# Patient Record
Sex: MALE | Race: WHITE | NOT HISPANIC OR LATINO | Employment: OTHER | URBAN - METROPOLITAN AREA
[De-identification: names, ages, dates, MRNs, and addresses within clinical notes are randomized per-mention and may not be internally consistent; named-entity substitution may affect disease eponyms.]

---

## 2023-07-17 LAB
ALBUMIN (URINE) MG/SPEC: 11.5 MG/L (ref 0–30)
ALBUMIN/CREATININE RATIO: 19.3 MG/GCREAT
ALT SERPL-CCNC: 27 U/L (ref 7–52)
AST SERPL-CCNC: 21 U/L
CHOLESTEROL (EXTERNAL): 177 MG/DL (ref 0–199)
CREATININE (EXTERNAL): 1.03 MG/DL (ref 0.7–1.3)
CREATININE (URINE): 59.7 MG/DL
GFR ESTIMATED (EXTERNAL): 80 ML/MIN/1.73M2
GLUCOSE (EXTERNAL): 105 MG/DL (ref 70–105)
HBA1C MFR BLD: 6.1 % (ref 4–6)
HDLC SERPL-MCNC: 58 MG/DL
LDL CHOLESTEROL CALCULATED (EXTERNAL): 89 MG/DL (ref 20–99)
POTASSIUM (EXTERNAL): 4.7 MMOL/L (ref 3.5–5.1)
TRIGLYCERIDES (EXTERNAL): 152 MG/DL

## 2023-07-19 ENCOUNTER — TRANSFERRED RECORDS (OUTPATIENT)
Dept: MULTI SPECIALTY CLINIC | Facility: CLINIC | Age: 68
End: 2023-07-19

## 2024-02-19 ENCOUNTER — OFFICE VISIT (OUTPATIENT)
Dept: FAMILY MEDICINE | Facility: CLINIC | Age: 69
End: 2024-02-19
Payer: MEDICARE

## 2024-02-19 ENCOUNTER — E-CONSULT (OUTPATIENT)
Dept: FAMILY MEDICINE | Facility: CLINIC | Age: 69
End: 2024-02-19

## 2024-02-19 VITALS
RESPIRATION RATE: 16 BRPM | BODY MASS INDEX: 27.78 KG/M2 | DIASTOLIC BLOOD PRESSURE: 72 MMHG | HEART RATE: 71 BPM | HEIGHT: 68 IN | SYSTOLIC BLOOD PRESSURE: 130 MMHG | WEIGHT: 183.3 LBS | OXYGEN SATURATION: 98 % | TEMPERATURE: 98 F

## 2024-02-19 DIAGNOSIS — F41.1 GENERALIZED ANXIETY DISORDER: ICD-10-CM

## 2024-02-19 DIAGNOSIS — G89.29 CHRONIC RIGHT-SIDED LOW BACK PAIN WITHOUT SCIATICA: ICD-10-CM

## 2024-02-19 DIAGNOSIS — R06.83 SNORING: ICD-10-CM

## 2024-02-19 DIAGNOSIS — Z90.79 HISTORY OF PROSTATECTOMY: ICD-10-CM

## 2024-02-19 DIAGNOSIS — Z12.11 SCREENING FOR COLON CANCER: ICD-10-CM

## 2024-02-19 DIAGNOSIS — Z85.46 HISTORY OF PROSTATE CANCER: ICD-10-CM

## 2024-02-19 DIAGNOSIS — R06.81 WITNESSED EPISODE OF APNEA: ICD-10-CM

## 2024-02-19 DIAGNOSIS — Z90.49 HISTORY OF COLECTOMY: ICD-10-CM

## 2024-02-19 DIAGNOSIS — I10 ESSENTIAL HYPERTENSION: ICD-10-CM

## 2024-02-19 DIAGNOSIS — G56.22 ULNAR NEUROPATHY OF LEFT UPPER EXTREMITY: Primary | ICD-10-CM

## 2024-02-19 DIAGNOSIS — R73.03 PREDIABETES: ICD-10-CM

## 2024-02-19 DIAGNOSIS — H90.3 SENSORINEURAL HEARING LOSS, BILATERAL: ICD-10-CM

## 2024-02-19 DIAGNOSIS — R06.83 SNORING: Primary | ICD-10-CM

## 2024-02-19 DIAGNOSIS — Z90.49 HISTORY OF APPENDECTOMY: ICD-10-CM

## 2024-02-19 DIAGNOSIS — E78.2 MIXED HYPERLIPIDEMIA: ICD-10-CM

## 2024-02-19 DIAGNOSIS — Z12.11 SCREEN FOR COLON CANCER: ICD-10-CM

## 2024-02-19 DIAGNOSIS — M54.50 CHRONIC RIGHT-SIDED LOW BACK PAIN WITHOUT SCIATICA: ICD-10-CM

## 2024-02-19 PROBLEM — G56.20 ULNAR NERVE COMPRESSION: Status: ACTIVE | Noted: 2023-03-01

## 2024-02-19 PROCEDURE — 99204 OFFICE O/P NEW MOD 45 MIN: CPT | Performed by: STUDENT IN AN ORGANIZED HEALTH CARE EDUCATION/TRAINING PROGRAM

## 2024-02-19 PROCEDURE — 99207 E-CONSULT TO SLEEP MEDICINE (ADULT OUTPT PROVIDER TO SPECIALIST WRITTEN QUESTION & RESPONSE): CPT | Performed by: STUDENT IN AN ORGANIZED HEALTH CARE EDUCATION/TRAINING PROGRAM

## 2024-02-19 PROCEDURE — 99451 NTRPROF PH1/NTRNET/EHR 5/>: CPT | Performed by: INTERNAL MEDICINE

## 2024-02-19 RX ORDER — ALPRAZOLAM 0.5 MG
TABLET ORAL
COMMUNITY
Start: 2023-03-31 | End: 2024-02-20

## 2024-02-19 RX ORDER — EZETIMIBE 10 MG/1
TABLET ORAL
COMMUNITY
Start: 2020-01-01 | End: 2024-05-06

## 2024-02-19 RX ORDER — FLUOXETINE 40 MG/1
CAPSULE ORAL 3 TIMES DAILY
COMMUNITY
Start: 2023-01-16 | End: 2024-05-06

## 2024-02-19 RX ORDER — ASPIRIN 81 MG/1
81 TABLET ORAL DAILY
COMMUNITY
End: 2024-05-06

## 2024-02-19 RX ORDER — LISINOPRIL 20 MG/1
TABLET ORAL
COMMUNITY
Start: 2020-01-01 | End: 2024-07-29

## 2024-02-19 RX ORDER — TRAZODONE HYDROCHLORIDE 100 MG/1
TABLET ORAL
COMMUNITY
Start: 2023-01-16 | End: 2024-05-06

## 2024-02-19 RX ORDER — METFORMIN HCL 500 MG
TABLET, EXTENDED RELEASE 24 HR ORAL
COMMUNITY
Start: 2024-01-16 | End: 2024-07-29

## 2024-02-19 NOTE — PROGRESS NOTES
Assessment & Plan     Corey is a 68-year-old male with prediabetes, hypertension, generalized anxiety disorder, bilateral sensorineural hearing loss, chronic right back pain, history of prostate cancer status post prostatectomy, appendectomy and colectomy who presents today to establish care and be evaluated for ulnar neuropathy    Ulnar neuropathy left  Has had numbness to 4th and 5th digit with parestheasis  since approximately July 2023, and onset of weakness of  recently.  He was seen by an orthopedist in [place] who did EMG in AUG 2023 that was diagnostic of ulnar neuropathy.  History and examination today are also consistent with this diagnosis.  He has not attempted any treatment thus far.  He was offered surgery and would like to pursue this further.  Discussed with patient that often occupational therapy is recommended prior to surgery, but he declines.  Referral was placed to orthopedic surgery to further evaluate and provide surgical treatment if necessary.   - Orthopedic  Referral    Snoring  Reports loud snoring and daytime tiredness.  Neck is 42 cm in circumference.  Has high risk of ENMANUEL given age, hypertension, loud snoring, gender, and report that his spouse has seen gasp for air/not breathe during sleep. Recommended for e-consult for further evaluation to which patient agreed. This was placed.   - Adult E-Consult to Sleep Medicine (Outpt Provider to Specialist Written Question & Response)    Screen for colon cancer  Has history of partial colectomy that was done due to appendicial tumor and prostate cancer s/p prostatectomy, and was recommended to have repeat colonoscopy every 5 years, last colonoscopy done 2019.  Referral placed to GI for colonoscopy for colon cancer screening.     Hyperlipidemia  On zetia, previously unable to tolerate statin    Prediabetes  Treated with metformin 500 mg daily.    Essential hypertension  Treated with lisionpril 20 mg daily, well controlled.  "    Generalized anxiety disorder  Treated with prozac 40 mg daily, well controlled. Also has xanax to use for panic episodes, that are infrequent, has not used for more than 9 months.  Trazodone helps patient to treat anxiety related insomnia, is well controlled on current regimen.     History of colectomy  See above.   - Colonoscopy Screening  Referral    History of prostate cancer  Reports history of prostatectomy    History of prostatectomy      History of appendectomy  Due to benign tumor of appendix    Chronic right-sided low back pain without sciatica  Well controlled with chiropractic care    Sensorineural hearing loss, bilateral   service connected, uses hearing aides    Screening for colon cancer  As above  - Colonoscopy Screening  Referral              BMI  Estimated body mass index is 28.1 kg/m  as calculated from the following:    Height as of this encounter: 1.72 m (5' 7.72\").    Weight as of this encounter: 83.1 kg (183 lb 4.8 oz).         Follow up in July for adult well exam, medicare annual visit.     Jovan Nicole is a 68 year old, presenting for the following health issues:  Establish Care (Ortho referral. Sleep apnea concerns. PSA labs in July.)        2/19/2024     2:26 PM   Additional Questions   Roomed by Kely VALERA     History of Present Illness       Reason for visit:  Ulnar Nerve and PCM  Symptom onset:  More than a month  Symptoms include:  Numbness in left hand  Symptom intensity:  Severe  Symptom progression:  Staying the same  Had these symptoms before:  No    He eats 4 or more servings of fruits and vegetables daily.He consumes 0 sweetened beverage(s) daily.He exercises with enough effort to increase his heart rate 60 or more minutes per day.  He exercises with enough effort to increase his heart rate 4 days per week.   He is taking medications regularly.     Has been noticing numbness and tingling of the left hand at the 4th and 5th digits since since " "about July 2023. More recently has been noticing weakness to the same digits.  Right hand is unaffected.  There was no preceding trauma/injury.  Has not attempted any other treatment thus far. Had been seen by an orthopedist who had recommended decompression surgery, but he had declined due to an upcoming move to [place] (was originally seen for this in [facility].       Reports loud snoring, with periods of not breathing during sleep/gasping for air during sleep observed by his spouse.  He has day time tiredness and usually takes a nap in the afternoon to combat this. Denies dozing off during the day.  Has never had a sleep study.     Review of Systems  Constitutional, neuro, ENT, endocrine, pulmonary, cardiac, gastrointestinal, genitourinary, musculoskeletal, integument and psychiatric systems are negative, except as otherwise noted.      Objective    /72 (BP Location: Right arm, Patient Position: Sitting, Cuff Size: Adult Regular)   Pulse 71   Temp 98  F (36.7  C) (Oral)   Resp 16   Ht 1.72 m (5' 7.72\")   Wt 83.1 kg (183 lb 4.8 oz)   SpO2 98%   BMI 28.10 kg/m    Body mass index is 28.1 kg/m .  Physical Exam   GENERAL: alert and no distress  EYES: Eyes grossly normal to inspection, and conjunctivae and sclerae normal  HENT: Nose and mouth without ulcers or lesions  NECK: no adenopathy, no asymmetry, masses, or scars, 42 cm in circumference  MS: no gross musculoskeletal defects noted, no edema.  4+/5 strength of  in left hand, otherwise normal strength of finger flexion and extension in all digits of the hands.  Mild hypothenar wasting appreciated.   SKIN: no suspicious lesions or rashes  NEURO: Decreased sensation to the 4th and 5th digits  of the left hand to light touch, tinels sign at elbow is positive, tinels signs at wrist is negative, phalens negative,  mentation intact and speech normal  PSYCH: mentation appears normal, affect normal/bright    No results found for any " previous visit.           Signed Electronically by: Lewis Atkinson MD

## 2024-02-19 NOTE — PROGRESS NOTES
"    2/19/2024     E-Consult has been accepted.    Interprofessional consultation requested by:  Lewis Atkinson MD      Clinical Question/Purpose: MY CLINICAL QUESTION IS: snoring and day time tiredness in this patient, would you recommend a home vs in lab sleep study vs in person consult?    [facility] E-CONSULT: OBSTRUCTIVE SLEEP APNEA     MY CLINICAL QUESTION IS: snoring and day time tiredness in this patient, would you recommend a home vs in lab sleep study vs in person consult?     The most current assessment of this problem can be found in the note dated 19 FEB 2024.   Relevant scanned information in media: (enter date and description):   Relevant care everywhere information: (enter date and description):     DECISION SUPPORT PROMPTS/GUIDANCE:   Please answer the following questions:    - Does the patient snore on most nights? Yes    - Is the patient excessively tired during the day? Yes    - Has the patient been observed to be stopping breathing in sleep or gasping/ choking in sleep? Yes    - Does the patient have any of the following?    - Significant insomnia? No    - Any parasomnias (abnormal behaviors in sleep)? No    - Excessive leg movements in sleep? No    - If a sleep study is requested, does the patient prefer in lab sleep study or home sleep testing?    - Is the patient already on CPAP or BIPAP? No    - If yes, where was the original sleep testing done and where do they get supplies? No sleep study done previously    - Does the patient have a diagnosis of COPD or neuromuscular disease? No (If yes, please place traditional referral to Sleep Medicine)    - Any other information that you think we should consider?has hypertension      OPTIONAL: (will populate if already in system)    - Sleep Study     Estimated body mass index is 28.1 kg/m  as calculated from the following:     Height as of this encounter: 1.72 m (5' 7.72\").     Weight as of this encounter: 83.1 kg (183 lb 4.8 oz).     Patient " assessment and information reviewed: All pertinent medical records were reviewed.  Patient has reported history of snoring, witnessed apneic episodes.  His clinical history is significant for hypertension in the absence of any significant cardiac or underlying lung disease.  The pretest probability for obstructive sleep apnea is high and hence a type III diagnostic home sleep study is a reasonable test for screening the presence and determine the severity of sleep apnea.  Recommendations:   1) Type 3 home sleep study. NOX 3 sleep study has been ordered.  2) Patient will be seen in the sleep medicine provider clinic for more detailed evaluation and discussion of management plan.      The recommendations provided in this E-Consult are based on a review of clinical data pertinent to the clinical question presented, without a review of the patient's complete medical record or, the benefit of a comprehensive in-person or virtual patient evaluation. This consultation should not replace the clinical judgement and evaluation of the provider ordering this E-Consult. Any new clinical issues, or changes in patient status since the filing of this E-Consult will need to be taken into account when assessing these recommendations. Please contact me if you have further questions.    My total time spent reviewing clinical information and formulating assessment was 10 minutes.        Lewis Atkinson MD

## 2024-02-20 ENCOUNTER — MYC REFILL (OUTPATIENT)
Dept: FAMILY MEDICINE | Facility: CLINIC | Age: 69
End: 2024-02-20
Payer: MEDICARE

## 2024-02-20 DIAGNOSIS — F41.0 ANXIETY ATTACK: Primary | ICD-10-CM

## 2024-02-21 RX ORDER — ALPRAZOLAM 0.5 MG
0.5 TABLET ORAL 3 TIMES DAILY PRN
Qty: 10 TABLET | Refills: 0 | Status: SHIPPED | OUTPATIENT
Start: 2024-02-21 | End: 2024-05-06

## 2024-02-29 ENCOUNTER — TRANSFERRED RECORDS (OUTPATIENT)
Dept: HEALTH INFORMATION MANAGEMENT | Facility: CLINIC | Age: 69
End: 2024-02-29
Payer: MEDICARE

## 2024-03-01 ENCOUNTER — TRANSFERRED RECORDS (OUTPATIENT)
Dept: HEALTH INFORMATION MANAGEMENT | Facility: CLINIC | Age: 69
End: 2024-03-01

## 2024-03-01 ENCOUNTER — TELEPHONE (OUTPATIENT)
Dept: ORTHOPEDICS | Facility: CLINIC | Age: 69
End: 2024-03-01
Payer: MEDICARE

## 2024-03-01 NOTE — TELEPHONE ENCOUNTER
Left brief message for patient. Caller would like to talk about upcoming appointment. It was documented on 2/19/24 that he would like surgery for his ulnar neuropathy, however, Dr. Yancey is not a surgeon.     Will call back.     Riley Colon ATC

## 2024-03-10 ENCOUNTER — HEALTH MAINTENANCE LETTER (OUTPATIENT)
Age: 69
End: 2024-03-10

## 2024-03-19 ENCOUNTER — OFFICE VISIT (OUTPATIENT)
Dept: ORTHOPEDICS | Facility: CLINIC | Age: 69
End: 2024-03-19
Attending: STUDENT IN AN ORGANIZED HEALTH CARE EDUCATION/TRAINING PROGRAM
Payer: MEDICARE

## 2024-03-19 VITALS
SYSTOLIC BLOOD PRESSURE: 118 MMHG | BODY MASS INDEX: 27.9 KG/M2 | HEIGHT: 68 IN | WEIGHT: 184.1 LBS | DIASTOLIC BLOOD PRESSURE: 78 MMHG

## 2024-03-19 DIAGNOSIS — G56.22 ULNAR NEUROPATHY OF LEFT UPPER EXTREMITY: ICD-10-CM

## 2024-03-19 PROCEDURE — 99204 OFFICE O/P NEW MOD 45 MIN: CPT | Performed by: STUDENT IN AN ORGANIZED HEALTH CARE EDUCATION/TRAINING PROGRAM
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
## 2024-03-19 NOTE — PROGRESS NOTES
Orthopaedic Surgery Hand and Upper Extremity Clinic H&P Note:  Date: Mar 19, 2024    Patient Name: Corey Gutierrez  MRN: [id number]    Consult requested by: Lewis Atkinson    CHIEF COMPLAINT: ***    Dominant Hand:***  Occupation: ***      HPI:  Mr. Corey Gutierrez is a 69 year old male *** hand dominant who presents with left cubital tunnel syndrome. He was previously seen at [facility] in [place], [place], but recently moved to [place] so is transition his care to [place].     EMG completed 8/31/23.       PMH  Diabetes***  Thyroid Problems***  Smoking Y/N***      PAST MEDICAL HISTORY:  No past medical history on file.    PAST SURGICAL HISTORY:  No past surgical history on file.    MEDICATIONS:  Current Outpatient Medications   Medication    ALPRAZolam (XANAX) 0.5 MG tablet    aspirin 81 MG EC tablet    ezetimibe (ZETIA) 10 MG tablet    FLUoxetine (PROZAC) 40 MG capsule    lisinopril (ZESTRIL) 20 MG tablet    metFORMIN (GLUCOPHAGE XR) 500 MG 24 hr tablet    traZODone (DESYREL) 100 MG tablet    UNABLE TO FIND     No current facility-administered medications for this visit.       ALLERGIES:   No Known Allergies    FAMILY HISTORY:  No pertinent family history    SOCIAL HISTORY:  Social History     Tobacco Use    Smoking status: Never     Passive exposure: Never    Smokeless tobacco: Never   Vaping Use    Vaping Use: Never used       The patient's past medical, family, and social history was reviewed and confirmed.    REVIEW OF SYMPTOMS:      General: Negative   Eyes: Negative   Ear, Nose and Throat: Negative   Respiratory: Negative   Cardiovascular: Negative   Gastrointestinal: Negative   Genito-urinary: Negative   Musculoskeletal: Negative  Neurological: Negative   Psychological: Negative  HEME: Negative   ENDO: Negative   SKIN: Negative    VITALS:  There were no vitals filed for this visit.    EXAM:  General: NAD, A&Ox3  HEENT: NC/AT  CV: RRR by peripheral pulse  Pulmonary: Non-labored breathing on RA    Left Upper  Extremity:    Inspection:  There is no evidence of open wounds.   There is no evidence of erythema or infection  There is no appreciable swelling or synovitis.  There is not appreciable intrinsic atrophy  There is well maintained thenar musculature    Palpation:  There is no palpable fluctuance  There is tenderness to palpation at ***  There is no tenderness to palpation at ***    Examination Maneuvers:  Tinel's test is *** at the ***  Compression testing is *** at the *** at *** seconds  Phalan's test is ***      Motor:  Intact in the median, radial, and ulnar nerve distributions    Sensory:  Intact to light touch in the median, radial, and ulnar nerve distributions      Measurements:    2pt discrimination: (Left)  Thumb: Radial: ***mm, Ulnar: ***mm  Index:    Radial: ***mm, Ulnar: ***mm  Long:    Radial: ***mm, Ulnar: ***mm  Ring:     Radial: ***mm, Ulnar: ***mm  Small:   Radial: ***mm, Ulnar: ***mm    Range of Motion:  (Wrist): (Left)  Flexion: *** degrees  Extension: *** degrees  Radial deviation:*** degrees  Ulnar deviation: *** degrees    (Digit: (***) (Left)  MCP: *** degrees  PIP: *** degrees  DIP: *** degrees    (Elbow): (Left)  Flexion: *** degrees  Extension: *** degrees  Pronation: *** degrees  Supination: *** degrees     Strength:  Left: ***kg, ***kg, ***kg    Pinch strength:    (Left):  (Oppositional)  Left: ***kg, ***kg, ***kg    (Appositional)  Left: ***kg, ***kg, ***kg       Right Upper Extremity:    Inspection:  There is no evidence of open wounds.   There is no evidence of erythema or infection  There is no appreciable swelling or synovitis.  There is no appreciable intrinsic atrophy  There is well maintained thenar musculature    Palpation:  There is no palpable fluctuance  There is tenderness to palpation at ***  There is no tenderness to palpation at ***    Examination Maneuvers:  Tinel's test is *** at the ***  Compression testing is *** at the *** at *** seconds  Phalan's test is  ***      Motor:  Intact in the median, radial, and ulnar nerve distributions    Sensory:  Intact to light touch in the median, radial, and ulnar nerve distributions      Measurements:    2pt discrimination: (Right)  Thumb: Radial: ***mm, Ulnar: ***mm  Index:    Radial: ***mm, Ulnar: ***mm  Long:    Radial: ***mm, Ulnar: ***mm  Ring:     Radial: ***mm, Ulnar: ***mm  Small:   Radial: ***mm, Ulnar: ***mm    Range of Motion:  (Wrist): (Right)  Flexion: *** degrees  Extension: *** degrees  Radial deviation:*** degrees  Ulnar deviation: *** degrees    (Digit: (***) (Right)  MCP: *** degrees  PIP: *** degrees  DIP: *** degrees    (Elbow): (Right)  Flexion: *** degrees  Extension: *** degrees  Pronation: *** degrees  Supination: *** degrees     Strength:  Right: ***kg, ***kg, ***kg    Pinch strength:    (Right):  (Oppositional)  Left: ***kg, ***kg, ***kg    (Appositional)  Left: ***kg, ***kg, ***kg     LABS:  {*** HELP TEXT ***    This SmartLink requires parameters for processing. Parameters allow for more information to be given to the SmartLink. This allows you to request specific information by giving the SmartLink precise direction.    The SmartLink accepts a list of result component base names  by commas. You can also request the number of results to display for each component. To indicate the number of results for each component, type the component name followed by a colon and then the number of results (Name:4). For all results for that particular component, type a star in place of the number (Name:*). To obtain the first and last results for a particular component, type FL in place of the number or the star (Name:FL). To obtain the last result for a particular component, type the component name without a colon, number, or star.    Example: .LABA1C[MCH:3,MCV:*,HCT  }     IMAGING:    ***  I have personally reviewed the above images and labs.         IMPRESSION AND RECOMMENDATIONS:  Mr. Corey Gutierrez is a  69 year old male *** hand dominant with ***    I personally reviewed external notes from *** and independently reviewed the above images, labs, and electrodiagnostic studies***.    The following tests have been ordered: ***    We discussed the patient's diagnosis and treatment options in detail today. This included a description of the associated pathology and non-operative/operative treatment options with the use of illustrations and diagrams.      George Garrett MD    Hand, Upper Extremity & Microvascular Surgery  Department of Orthopaedic Surgery  [facility]

## 2024-03-26 ENCOUNTER — TELEPHONE (OUTPATIENT)
Dept: ORTHOPEDICS | Facility: CLINIC | Age: 69
End: 2024-03-26

## 2024-03-26 PROBLEM — G56.22 ULNAR NEUROPATHY OF LEFT UPPER EXTREMITY: Status: ACTIVE | Noted: 2024-03-19

## 2024-03-26 NOTE — TELEPHONE ENCOUNTER
Patient has been scheduled for surgery. Details are below.    Date of Surgery: 05/30/24    Approximate Arrival Time: SURGERY CENTER WILL CALL 3/4 DAYS PRIOR TO CONFIRM A TIME   Surgeon:  DR. JOSHUA HICKMAN     Procedure: RELEASE CUBITAL TUNNEL, LEFT  Location: [facility] [address]  Surgery Consult: NA  PreOp Physical: 05/06/24  PostOp: 06/10/24  Packet Mailed/[email or web address] Sent: YES  Added to [place]: YES    Spoke to: DAMIÁN

## 2024-04-23 ASSESSMENT — SLEEP AND FATIGUE QUESTIONNAIRES
HOW LIKELY ARE YOU TO NOD OFF OR FALL ASLEEP WHILE SITTING INACTIVE IN A PUBLIC PLACE: WOULD NEVER DOZE
HOW LIKELY ARE YOU TO NOD OFF OR FALL ASLEEP WHILE LYING DOWN TO REST IN THE AFTERNOON WHEN CIRCUMSTANCES PERMIT: HIGH CHANCE OF DOZING
HOW LIKELY ARE YOU TO NOD OFF OR FALL ASLEEP WHILE SITTING AND TALKING TO SOMEONE: WOULD NEVER DOZE
HOW LIKELY ARE YOU TO NOD OFF OR FALL ASLEEP WHEN YOU ARE A PASSENGER IN A CAR FOR AN HOUR WITHOUT A BREAK: MODERATE CHANCE OF DOZING
HOW LIKELY ARE YOU TO NOD OFF OR FALL ASLEEP WHILE SITTING QUIETLY AFTER LUNCH WITHOUT ALCOHOL: WOULD NEVER DOZE
HOW LIKELY ARE YOU TO NOD OFF OR FALL ASLEEP WHILE SITTING AND READING: MODERATE CHANCE OF DOZING
HOW LIKELY ARE YOU TO NOD OFF OR FALL ASLEEP WHILE WATCHING TV: SLIGHT CHANCE OF DOZING
HOW LIKELY ARE YOU TO NOD OFF OR FALL ASLEEP IN A CAR, WHILE STOPPED FOR A FEW MINUTES IN TRAFFIC: WOULD NEVER DOZE

## 2024-04-25 ENCOUNTER — TELEPHONE (OUTPATIENT)
Dept: FAMILY MEDICINE | Facility: CLINIC | Age: 69
End: 2024-04-25
Payer: MEDICARE

## 2024-04-25 NOTE — TELEPHONE ENCOUNTER
Patient Quality Outreach    Patient is due for the following:   Physical Annual Wellness Visit    Next Steps:   Chart routed to abstraction.      Type of outreach:    Chart review performed, no outreach needed.      Questions for provider review:    None           JOSE Rodriguez

## 2024-04-30 ENCOUNTER — OFFICE VISIT (OUTPATIENT)
Dept: SLEEP MEDICINE | Facility: CLINIC | Age: 69
End: 2024-04-30
Attending: INTERNAL MEDICINE
Payer: MEDICARE

## 2024-04-30 DIAGNOSIS — R06.81 WITNESSED EPISODE OF APNEA: ICD-10-CM

## 2024-04-30 DIAGNOSIS — R06.83 SNORING: ICD-10-CM

## 2024-04-30 PROCEDURE — 95800 SLP STDY UNATTENDED: CPT

## 2024-05-01 ENCOUNTER — DOCUMENTATION ONLY (OUTPATIENT)
Dept: SLEEP MEDICINE | Facility: CLINIC | Age: 69
End: 2024-05-01
Attending: INTERNAL MEDICINE
Payer: MEDICARE

## 2024-05-01 NOTE — PROGRESS NOTES
Pt returned HST device. It was downloaded and forwarded data to the clinical specialist for scoring.   Gina Rodriguez MA

## 2024-05-02 NOTE — PROGRESS NOTES
HST POST-STUDY QUESTIONNAIRE    What time did you go to bed?  902PM  How long do you think it took to fall asleep?  30MINS  What time did you wake up to start the day?  616AM  Did you get up during the night at all?  YES  If you woke up, do you remember approximately what time(s)? ABOUT 3 TIMES TO USE THE BATHROOOM, DON'T RECALL THE TIMES.  Did you have any difficulty with the equipment?  No  Did you us any type of treatment with this study?  None  Was the head of the bed elevated? No  Did you sleep in a recliner?  No  Did you stop using CPAP at least 3 days before this test?  NA  Any other information you'd like us to know?     
Pt is completing a home sleep test. Pt was instructed on how to put on the Noxturnal T3 device and associated equipment before going to bed and given the opportunity to practice putting it on before leaving the sleep center. Pt was reminded to bring the home sleep test kit back to the center tomorrow, at agreed upon time for download and reporting.   Neck circumference: 42 CM / 16.5 inches.    
This HSAT was performed using a Noxturnal T3 device which recorded snore, sound, movement activity, body position, nasal pressure, oronasal thermal airflow, pulse, oximetry and both chest and abdominal respiratory effort. HSAT data was restricted to the time patient states they were in bed.     HSAT was scored using 1B 4% hypopnea rule.     AHI: 5.0  Snoring was reported as loud.  Time with SpO2 below 89% was 2.4 minutes.   Overall signal quality was good     Pt will follow up with sleep provider to determine appropriate therapy.     Ordering Provider, Renuka Hinojosa MD C. Oyugi, BA, RPSGT, RST System Clinical Specialist/ 5/2/2024   
good urine output  c/w oxybutynin.

## 2024-05-06 ENCOUNTER — OFFICE VISIT (OUTPATIENT)
Dept: FAMILY MEDICINE | Facility: CLINIC | Age: 69
End: 2024-05-06
Payer: MEDICARE

## 2024-05-06 VITALS
RESPIRATION RATE: 14 BRPM | HEIGHT: 68 IN | SYSTOLIC BLOOD PRESSURE: 128 MMHG | DIASTOLIC BLOOD PRESSURE: 70 MMHG | OXYGEN SATURATION: 97 % | TEMPERATURE: 97.9 F | HEART RATE: 78 BPM | BODY MASS INDEX: 28.2 KG/M2 | WEIGHT: 186.1 LBS

## 2024-05-06 DIAGNOSIS — G56.22 ULNAR NEUROPATHY OF LEFT UPPER EXTREMITY: ICD-10-CM

## 2024-05-06 DIAGNOSIS — I10 ESSENTIAL HYPERTENSION: ICD-10-CM

## 2024-05-06 DIAGNOSIS — D64.9 ANEMIA, UNSPECIFIED TYPE: Primary | ICD-10-CM

## 2024-05-06 DIAGNOSIS — C61 PROSTATE CANCER (H): ICD-10-CM

## 2024-05-06 DIAGNOSIS — R06.83 SNORING: ICD-10-CM

## 2024-05-06 DIAGNOSIS — E78.2 MIXED HYPERLIPIDEMIA: ICD-10-CM

## 2024-05-06 DIAGNOSIS — F41.1 GENERALIZED ANXIETY DISORDER: ICD-10-CM

## 2024-05-06 DIAGNOSIS — R73.03 PREDIABETES: ICD-10-CM

## 2024-05-06 DIAGNOSIS — Z01.818 PRE-OP EXAM: Primary | ICD-10-CM

## 2024-05-06 PROBLEM — F43.20 ADJUSTMENT REACTION: Chronic | Status: ACTIVE | Noted: 2020-07-10

## 2024-05-06 PROBLEM — M19.019 OSTEOARTHRITIS OF SHOULDER: Status: ACTIVE | Noted: 2020-07-09

## 2024-05-06 PROBLEM — M54.30 SCIATICA: Status: ACTIVE | Noted: 2020-07-09

## 2024-05-06 PROBLEM — K42.9 UMBILICAL HERNIA: Status: ACTIVE | Noted: 2020-07-09

## 2024-05-06 LAB
ATRIAL RATE - MUSE: 61 BPM
BASOPHILS # BLD AUTO: 0 10E3/UL (ref 0–0.2)
BASOPHILS NFR BLD AUTO: 1 %
DIASTOLIC BLOOD PRESSURE - MUSE: NORMAL MMHG
EOSINOPHIL # BLD AUTO: 0.4 10E3/UL (ref 0–0.7)
EOSINOPHIL NFR BLD AUTO: 9 %
ERYTHROCYTE [DISTWIDTH] IN BLOOD BY AUTOMATED COUNT: 12.8 % (ref 10–15)
HCT VFR BLD AUTO: 38.1 % (ref 40–53)
HCV AB SERPL QL IA: NONREACTIVE
HGB BLD-MCNC: 12.8 G/DL (ref 13.3–17.7)
IMM GRANULOCYTES # BLD: 0 10E3/UL
IMM GRANULOCYTES NFR BLD: 0 %
INTERPRETATION ECG - MUSE: NORMAL
LYMPHOCYTES # BLD AUTO: 1 10E3/UL (ref 0.8–5.3)
LYMPHOCYTES NFR BLD AUTO: 25 %
MCH RBC QN AUTO: 31.8 PG (ref 26.5–33)
MCHC RBC AUTO-ENTMCNC: 33.6 G/DL (ref 31.5–36.5)
MCV RBC AUTO: 95 FL (ref 78–100)
MONOCYTES # BLD AUTO: 0.5 10E3/UL (ref 0–1.3)
MONOCYTES NFR BLD AUTO: 11 %
NEUTROPHILS # BLD AUTO: 2.2 10E3/UL (ref 1.6–8.3)
NEUTROPHILS NFR BLD AUTO: 54 %
P AXIS - MUSE: 28 DEGREES
PLATELET # BLD AUTO: 259 10E3/UL (ref 150–450)
PR INTERVAL - MUSE: 148 MS
QRS DURATION - MUSE: 80 MS
QT - MUSE: 410 MS
QTC - MUSE: 412 MS
R AXIS - MUSE: -3 DEGREES
RBC # BLD AUTO: 4.02 10E6/UL (ref 4.4–5.9)
SYSTOLIC BLOOD PRESSURE - MUSE: NORMAL MMHG
T AXIS - MUSE: 35 DEGREES
VENTRICULAR RATE- MUSE: 61 BPM
WBC # BLD AUTO: 4.1 10E3/UL (ref 4–11)

## 2024-05-06 PROCEDURE — G2211 COMPLEX E/M VISIT ADD ON: HCPCS | Performed by: STUDENT IN AN ORGANIZED HEALTH CARE EDUCATION/TRAINING PROGRAM

## 2024-05-06 PROCEDURE — 36415 COLL VENOUS BLD VENIPUNCTURE: CPT | Performed by: STUDENT IN AN ORGANIZED HEALTH CARE EDUCATION/TRAINING PROGRAM

## 2024-05-06 PROCEDURE — 93010 ELECTROCARDIOGRAM REPORT: CPT | Mod: OFF | Performed by: INTERNAL MEDICINE

## 2024-05-06 PROCEDURE — 86803 HEPATITIS C AB TEST: CPT | Performed by: STUDENT IN AN ORGANIZED HEALTH CARE EDUCATION/TRAINING PROGRAM

## 2024-05-06 PROCEDURE — 99214 OFFICE O/P EST MOD 30 MIN: CPT | Performed by: STUDENT IN AN ORGANIZED HEALTH CARE EDUCATION/TRAINING PROGRAM

## 2024-05-06 PROCEDURE — 93005 ELECTROCARDIOGRAM TRACING: CPT | Performed by: STUDENT IN AN ORGANIZED HEALTH CARE EDUCATION/TRAINING PROGRAM

## 2024-05-06 PROCEDURE — 85025 COMPLETE CBC W/AUTO DIFF WBC: CPT | Performed by: STUDENT IN AN ORGANIZED HEALTH CARE EDUCATION/TRAINING PROGRAM

## 2024-05-06 RX ORDER — ROSUVASTATIN CALCIUM 5 MG/1
5 TABLET, COATED ORAL DAILY
Qty: 90 TABLET | Refills: 3 | Status: SHIPPED | OUTPATIENT
Start: 2024-05-06

## 2024-05-06 RX ORDER — TRAZODONE HYDROCHLORIDE 100 MG/1
100 TABLET ORAL AT BEDTIME
Qty: 30 TABLET | Refills: 3 | Status: SHIPPED | OUTPATIENT
Start: 2024-05-06 | End: 2024-09-03

## 2024-05-06 RX ORDER — SILDENAFIL 25 MG/1
25 TABLET, FILM COATED ORAL
COMMUNITY
Start: 2022-07-15

## 2024-05-06 RX ORDER — FLUOXETINE 40 MG/1
80 CAPSULE ORAL DAILY
Qty: 180 CAPSULE | Refills: 3 | Status: SHIPPED | OUTPATIENT
Start: 2024-05-06

## 2024-05-06 NOTE — PROGRESS NOTES
Preoperative Evaluation  [facility]  [address] [address] [facility] [address]  Phone: [phone]  Fax: [phone]  Primary Provider: Armin Atkinson  Pre-op Performing Provider: ARMIN ATKINSON  May 6, 2024       Corey is a 69 year old, presenting for the following:  Pre-Op Exam        5/6/2024     7:31 AM   Additional Questions   Roomed by Kely VALERA     Surgical Information  Surgery/Procedure: RELEASE, CUBITAL TUNNEL LEFT   Surgery Location: [facility] [place]  Surgeon: George Garrett MD  Surgery Date: 05/31/2024  Time of Surgery: 12:45 PM  Where patient plans to recover: At home with family  Fax number for surgical facility: Note does not need to be faxed, will be available electronically in Epic.    Assessment & Plan     The proposed surgical procedure is considered INTERMEDIATE risk.    Pre-op exam  Ulnar neuropathy of left upper extremity  Has had numbness to 4th and 5th digit with parestheasis since approximately July 2023, and onset of weakness of  recently.  Was offered cubital tunnel release by orthopedic surgery, upcoming surgery on May 31, 2024.    - CBC with platelets and differential  - EKG 12-lead, tracing only    Generalized anxiety disorder  Patient has been taking fluoxetine 120 mg daily, this is over the recommended dose, recommend that we decrease to 80 mg daily and monitor.  Also he has been out of trazodone which has made anxiety somewhat worse, we will restart trazodone, he is aware of serotonin syndrome risks and symptoms.  - FLUoxetine (PROZAC) 40 MG capsule  Dispense: 180 capsule; Refill: 3  - traZODone (DESYREL) 100 MG tablet  Dispense: 30 tablet; Refill: 3    Mixed hyperlipidemia  Patient with LDL at 100, I do recommend that we switch that he for Crestor, he reports he did have some muscle pain with prior statin medication but cannot remember which 1.  He would be willing to try a statin medication again, we will  attempt a low-dose of Crestor.  Recommend repeat lipid panel at his next annual.  - rosuvastatin (CRESTOR) 5 MG tablet  Dispense: 90 tablet; Refill: 3    Prostate cancer (H)  Reports history of prostatectomy     Essential hypertension  Well-controlled on lisinopril 20 mg, continue current treatment    Prediabetes  Well-controlled on metformin, last A1c 6.0% on March 1, 2024    Snoring  Reports loud snoring and daytime tiredness. Neck is 42 cm in circumference. Has high risk of ENMANUEL given age, hypertension, loud snoring, gender, and report that his spouse has seen gasp for air/not breathe during sleep.  Has upcoming visit with sleep medicine for evaluation with sleep study.      Possible Sleep Apnea: Has sleep testing upcoming.          Risks and Recommendations  The patient has the following additional risks and recommendations for perioperative complications:   - No identified additional risk factors other than previously addressed    Antiplatelet or Anticoagulation Medication Instructions   - Patient is on no antiplatelet or anticoagulation medications.    Additional Medication Instructions  Patient is to take all scheduled medications on the day of surgery EXCEPT for modifications listed below:   - ACE/ARB: HOLD on day of surgery (minimum 11 hours for general anesthesia).   - fenofibrate, niacin, non-statin lipid meds: HOLD on day of surgery.   - Statins: Initiate statin for patients with cardivascular indications.   - metformin: HOLD day of surgery.    Recommendation  APPROVAL GIVEN to proceed with proposed procedure, without further diagnostic evaluation.          Subjective       HPI related to upcoming procedure:         4/29/2024    11:28 AM   Preop Questions   1. Have you ever had a heart attack or stroke? No   2. Have you ever had surgery on your heart or blood vessels, such as a stent placement, a coronary artery bypass, or surgery on an artery in your head, neck, heart, or legs? No   3. Do you have chest  pain with activity? No   4. Do you have a history of  heart failure? No   5. Do you currently have a cold, bronchitis or symptoms of other infection? No   6. Do you have a cough, shortness of breath, or wheezing? No   7. Do you or anyone in your family have previous history of blood clots? No   8. Do you or does anyone in your family have a serious bleeding problem such as prolonged bleeding following surgeries or cuts? No   9. Have you ever had problems with anemia or been told to take iron pills? YES - previously with sepsis, resolved.    10. Have you had any abnormal blood loss such as black, tarry or bloody stools? No   11. Have you ever had a blood transfusion? No   12. Are you willing to have a blood transfusion if it is medically needed before, during, or after your surgery? YES   13. Have you or any of your relatives ever had problems with anesthesia? No   14. Do you have sleep apnea, excessive snoring or daytime drowsiness? UNKNOWN - currently awaiting sleep study   15. Do you have any artifical heart valves or other implanted medical devices like a pacemaker, defibrillator, or continuous glucose monitor? No   16. Do you have artificial joints? No   17. Are you allergic to latex? No       Health Care Directive  Patient does not have a Health Care Directive or Living Will: Patient states has Advance Directive and will bring in a copy to clinic.    Preoperative Review of    reviewed - no record of controlled substances prescribed.          Patient Active Problem List    Diagnosis Date Noted    Ulnar neuropathy of left upper extremity 03/19/2024     Priority: Medium    Prediabetes 02/19/2024     Priority: Medium    Essential hypertension 02/19/2024     Priority: Medium    Generalized anxiety disorder 02/19/2024     Priority: Medium    History of prostate cancer 02/19/2024     Priority: Medium    History of colectomy 02/19/2024     Priority: Medium    History of prostatectomy 02/19/2024     Priority:  "Medium    History of appendectomy 02/19/2024     Priority: Medium    Chronic right-sided low back pain without sciatica 02/19/2024     Priority: Medium    Sensorineural hearing loss, bilateral 02/19/2024     Priority: Medium    Snoring 02/19/2024     Priority: Medium    Lesion of left ulnar nerve 02/19/2024     Priority: Medium    Ulnar nerve compression 03/01/2023     Priority: Medium      No past medical history on file.  No past surgical history on file.  Current Outpatient Medications   Medication Sig Dispense Refill    ezetimibe (ZETIA) 10 MG tablet       FLUoxetine (PROZAC) 40 MG capsule 3 times daily      lisinopril (ZESTRIL) 20 MG tablet       LUTEIN PO Take 1 tablet by mouth at bedtime      metFORMIN (GLUCOPHAGE XR) 500 MG 24 hr tablet TAKE 1 TAB BY MOUTH WITH BREAKFAST DAILY      sildenafil (VIAGRA) 25 MG tablet Take 25 mg by mouth      traZODone (DESYREL) 100 MG tablet       UNABLE TO FIND MEDICATION NAME: Multivitamin      ALPRAZolam (XANAX) 0.5 MG tablet Take 1 tablet (0.5 mg) by mouth 3 times daily as needed for anxiety (and panic) 10 tablet 0    aspirin 81 MG EC tablet Take 81 mg by mouth daily         No Known Allergies     Social History     Tobacco Use    Smoking status: Never     Passive exposure: Never    Smokeless tobacco: Never   Substance Use Topics    Alcohol use: Not on file       History   Drug Use Not on file         Review of Systems    Review of Systems  Constitutional, neuro, ENT, endocrine, pulmonary, cardiac, gastrointestinal, genitourinary, musculoskeletal, integument and psychiatric systems are negative, except as otherwise noted.    Objective    /70 (BP Location: Right arm, Patient Position: Sitting, Cuff Size: Adult Regular)   Pulse 78   Temp 97.9  F (36.6  C) (Oral)   Resp 14   Ht 1.727 m (5' 8\")   Wt 84.4 kg (186 lb 1.6 oz)   SpO2 97%   BMI 28.30 kg/m     Estimated body mass index is 28.3 kg/m  as calculated from the following:    Height as of this encounter: 1.727 " "m (5' 8\").    Weight as of this encounter: 84.4 kg (186 lb 1.6 oz).  Physical Exam  GENERAL: alert and no distress  EYES: Eyes grossly normal to inspection, PERRL and conjunctivae and sclerae normal  HENT: ear canals and TM's normal, nose and mouth without ulcers or lesions  NECK: no adenopathy, no asymmetry, masses, or scars  RESP: lungs clear to auscultation - no rales, rhonchi or wheezes  CV: regular rate and rhythm, normal S1 S2, no S3 or S4, no murmur, click or rub, no peripheral edema  ABDOMEN: soft, nontender, no hepatosplenomegaly, no masses and bowel sounds normal  MS: no gross musculoskeletal defects noted, no edema  SKIN: no suspicious lesions or rashes  NEURO: Normal strength and tone, mentation intact and speech normal  PSYCH: mentation appears normal, affect normal/bright    No results for input(s): \"HGB\", \"PLT\", \"INR\", \"NA\", \"POTASSIUM\", \"CR\", \"A1C\" in the last 27797 hours.     Diagnostics  No results found for this or any previous visit (from the past 720 hour(s)).   EKG: appears normal, NSR, normal axis, normal intervals, no acute ST/T changes c/w ischemia, no LVH by voltage criteria, unchanged from previous tracings    Revised Cardiac Risk Index (RCRI)  The patient has the following serious cardiovascular risks for perioperative complications:   - No serious cardiac risks = 0 points     RCRI Interpretation: 0 points: Class I (very low risk - 0.4% complication rate)         Signed Electronically by: Lewis Atkinson MD  Copy of this evaluation report is provided to requesting physician.         "

## 2024-05-06 NOTE — PATIENT INSTRUCTIONS
Preparing for Your Surgery  Getting started  A nurse will call you to review your health history and instructions. They will give you an arrival time based on your scheduled surgery time. Please be ready to share:  Your doctor's clinic name and phone number  Your medical, surgical, and anesthesia history  A list of allergies and sensitivities  A list of medicines, including herbal treatments and over-the-counter drugs  Whether the patient has a legal guardian (ask how to send us the papers in advance)  Please tell us if you're pregnant--or if there's any chance you might be pregnant. Some surgeries may injure a fetus (unborn baby), so they require a pregnancy test. Surgeries that are safe for a fetus don't always need a test, and you can choose whether to have one.   If you have a child who's having surgery, please ask for a copy of Preparing for Your Child's Surgery.    Preparing for surgery  Within 10 to 30 days of surgery: Have a pre-op exam (sometimes called an H&P, or History and Physical). This can be done at a clinic or pre-operative center.  If you're having a , you may not need this exam. Talk to your care team.  At your pre-op exam, talk to your care team about all medicines you take. If you need to stop any medicines before surgery, ask when to start taking them again.  We do this for your safety. Many medicines can make you bleed too much during surgery. Some change how well surgery (anesthesia) drugs work.  Call your insurance company to let them know you're having surgery. (If you don't have insurance, call [phone].)  Call your clinic if there's any change in your health. This includes signs of a cold or flu (sore throat, runny nose, cough, rash, fever). It also includes a scrape or scratch near the surgery site.  If you have questions on the day of surgery, call your hospital or surgery center.  Eating and drinking guidelines  For your safety: Unless your surgeon tells you otherwise,  follow the guidelines below.  Eat and drink as usual until 8 hours before you arrive for surgery. After that, no food or milk.  Drink clear liquids until 2 hours before you arrive. These are liquids you can see through, like water, Gatorade, and Propel Water. They also include plain black coffee and tea (no cream or milk), candy, and breath mints. You can spit out gum when you arrive.  If you drink alcohol: Stop drinking it the night before surgery.  If your care team tells you to take medicine on the morning of surgery, it's okay to take it with a sip of water.  Preventing infection  Shower or bathe the night before and morning of your surgery. Follow the instructions your clinic gave you. (If no instructions, use regular soap.)  Don't shave or clip hair near your surgery site. We'll remove the hair if needed.  Don't smoke or vape the morning of surgery. You may chew nicotine gum up to 2 hours before surgery. A nicotine patch is okay.  Note: Some surgeries require you to completely quit smoking and nicotine. Check with your surgeon.  Your care team will make every effort to keep you safe from infection. We will:  Clean our hands often with soap and water (or an alcohol-based hand rub).  Clean the skin at your surgery site with a special soap that kills germs.  Give you a special gown to keep you warm. (Cold raises the risk of infection.)  Wear special hair covers, masks, gowns and gloves during surgery.  Give antibiotic medicine, if prescribed. Not all surgeries need antibiotics.  What to bring on the day of surgery  Photo ID and insurance card  Copy of your health care directive, if you have one  Glasses and hearing aids (bring cases)  You can't wear contacts during surgery  Inhaler and eye drops, if you use them (tell us about these when you arrive)  CPAP machine or breathing device, if you use them  A few personal items, if spending the night  If you have . . .  A pacemaker, ICD (cardiac defibrillator) or other  implant: Bring the ID card.  An implanted stimulator: Bring the remote control.  A legal guardian: Bring a copy of the certified (court-stamped) guardianship papers.  Please remove any jewelry, including body piercings. Leave jewelry and other valuables at home.  If you're going home the day of surgery  You must have a responsible adult drive you home. They should stay with you overnight as well.  If you don't have someone to stay with you, and you aren't safe to go home alone, we may keep you overnight. Insurance often won't pay for this.  After surgery  If it's hard to control your pain or you need more pain medicine, please call your surgeon's office.  Questions?   If you have any questions for your care team, list them here: _________________________________________________________________________________________________________________________________________________________________________ ____________________________________ ____________________________________ ____________________________________  For informational purposes only. Not to replace the advice of your health care provider. Copyright   2003, 2019 [employer]. All rights reserved. Clinically reviewed by Kay Gates MD. [employer] 795595 - REV 12/22.    How to Take Your Medication Before Surgery  - HOLD (do not take) your METFORMIN on the morning of surgery.  - HOLD (do not take) lisinopril on day of surgery  - STOP taking all vitamins and herbal supplements 14 days before surgery.

## 2024-05-07 ENCOUNTER — MYC REFILL (OUTPATIENT)
Dept: FAMILY MEDICINE | Facility: CLINIC | Age: 69
End: 2024-05-07
Payer: MEDICARE

## 2024-05-07 NOTE — TELEPHONE ENCOUNTER
Routine is a supplements, not a medicine and can improve eye, heart and brain health potentially.  I would not plan a prescription for this.  I was only under the understanding that the patient was actually taking the supplement.  Sent the patient a message to this effect.

## 2024-05-07 NOTE — PROCEDURES
"HOME SLEEP STUDY INTERPRETATION        Patient: Corey Gutierrez  MRN: [id number]  YOB: 1955  Study Date: 2024  PCP/Referring Provider: Lewis Atkinson;   Ordering Provider: Renuka Hinojosa MD.         Indications for Home Study: Corey Gutierrez is a 69 year old male with a history of hypertension, generalized anxiety disorder and chronic pain who presents with snoring.    Estimated body mass index is 28.3 kg/m  as calculated from the following:    Height as of 24: 1.727 m (5' 8\").    Weight as of 24: 84.4 kg (186 lb 1.6 oz).  [place] Sleepiness Scale:   STOP-BAN/8        Data: A full night home sleep study was performed recording the standard physiologic parameters including body position, movement, sound, nasal pressure, thermal oral airflow, chest and abdominal movements with respiratory inductance plethysmography, and oxygen saturation by pulse oximetry. Pulse rate was estimated by oximetry recording. This study was considered adequate based on > 4 hours of quality oximetry and respiratory recording. As specified by the AASM Manual for the Scoring of Sleep and Associated events, version 2.3, Rule VIII.D 1B, 4% oxygen desaturation scoring for hypopneas is used as a standard of care on all home sleep apnea testing.        Analysis Time:  516.4 minutes        Respiration:   Sleep Associated Hypoxemia: sustained hypoxemia was not present. Baseline oxygen saturation was 92.1%.  Time with saturation less than or equal to 88% was 0.9 minutes. The lowest oxygen saturation was 84%.   Snoring: Snoring was present.  Respiratory events: The home study revealed a presence of 2 obstructive apneas and 13 mixed and central apneas. There were 28 hypopneas resulting in a combined apnea/hypopnea index [AHI] of 5 events per hour.  AHI was 6 per hour supine, 0 per hour prone, 0 per hour on left side, and 2.9 per hour on right side.   Pattern: Excluding events noted above, respiratory rate and pattern " was Normal.      Position: Percent of time spent: supine -67.8%, prone -0%, on left -0%, on right -32.2%.      Heart Rate: By pulse oximetry normal rate was noted.       Assessment:   Mild obstructive sleep apnea.  Sleep associated hypoxemia was not present.    Recommendations:  Consider auto-CPAP at 5-15 cmH2O, oral appliance therapy, or positional therapy.  Suggest optimizing sleep hygiene and avoiding sleep deprivation.  Weight management.        Diagnosis Code(s): Obstructive Sleep Apnea G47.33, Snoring R06.83    Electronically signed by: Ashish Grayson MD, May 7, 2024   Diplomate, American Board of Internal Medicine, Sleep Medicine

## 2024-05-29 ENCOUNTER — ANESTHESIA EVENT (OUTPATIENT)
Dept: SURGERY | Facility: AMBULATORY SURGERY CENTER | Age: 69
End: 2024-05-29
Payer: MEDICARE

## 2024-05-31 ENCOUNTER — HOSPITAL ENCOUNTER (OUTPATIENT)
Facility: AMBULATORY SURGERY CENTER | Age: 69
Discharge: HOME | End: 2024-05-31
Attending: STUDENT IN AN ORGANIZED HEALTH CARE EDUCATION/TRAINING PROGRAM | Admitting: STUDENT IN AN ORGANIZED HEALTH CARE EDUCATION/TRAINING PROGRAM
Payer: MEDICARE

## 2024-05-31 ENCOUNTER — ANESTHESIA (OUTPATIENT)
Dept: SURGERY | Facility: AMBULATORY SURGERY CENTER | Age: 69
End: 2024-05-31
Payer: MEDICARE

## 2024-05-31 VITALS
HEART RATE: 59 BPM | SYSTOLIC BLOOD PRESSURE: 120 MMHG | TEMPERATURE: 97 F | BODY MASS INDEX: 26.98 KG/M2 | RESPIRATION RATE: 16 BRPM | WEIGHT: 178 LBS | HEIGHT: 68 IN | DIASTOLIC BLOOD PRESSURE: 68 MMHG | OXYGEN SATURATION: 96 %

## 2024-05-31 DIAGNOSIS — G56.22 ENTRAPMENT OF LEFT ULNAR NERVE: Primary | ICD-10-CM

## 2024-05-31 LAB — GLUCOSE BLDC GLUCOMTR-MCNC: 105 MG/DL (ref 70–99)

## 2024-05-31 PROCEDURE — 82962 GLUCOSE BLOOD TEST: CPT | Performed by: PATHOLOGY

## 2024-05-31 PROCEDURE — 64718 REVISE ULNAR NERVE AT ELBOW: CPT | Mod: LT

## 2024-05-31 PROCEDURE — 64718 REVISE ULNAR NERVE AT ELBOW: CPT | Performed by: STUDENT IN AN ORGANIZED HEALTH CARE EDUCATION/TRAINING PROGRAM

## 2024-05-31 PROCEDURE — 64718 REVISE ULNAR NERVE AT ELBOW: CPT | Performed by: NURSE ANESTHETIST, CERTIFIED REGISTERED

## 2024-05-31 RX ORDER — HYDROMORPHONE HYDROCHLORIDE 1 MG/ML
0.4 INJECTION, SOLUTION INTRAMUSCULAR; INTRAVENOUS; SUBCUTANEOUS EVERY 5 MIN PRN
Status: DISCONTINUED | OUTPATIENT
Start: 2024-05-31 | End: 2024-06-01 | Stop reason: HOSPADM

## 2024-05-31 RX ORDER — NALOXONE HYDROCHLORIDE 0.4 MG/ML
0.1 INJECTION, SOLUTION INTRAMUSCULAR; INTRAVENOUS; SUBCUTANEOUS
Status: DISCONTINUED | OUTPATIENT
Start: 2024-05-31 | End: 2024-06-01 | Stop reason: HOSPADM

## 2024-05-31 RX ORDER — NALOXONE HYDROCHLORIDE 0.4 MG/ML
0.4 INJECTION, SOLUTION INTRAMUSCULAR; INTRAVENOUS; SUBCUTANEOUS
Status: DISCONTINUED | OUTPATIENT
Start: 2024-05-31 | End: 2024-06-01 | Stop reason: HOSPADM

## 2024-05-31 RX ORDER — ACETAMINOPHEN 325 MG/1
975 TABLET ORAL ONCE
Status: COMPLETED | OUTPATIENT
Start: 2024-05-31 | End: 2024-05-31

## 2024-05-31 RX ORDER — LIDOCAINE HYDROCHLORIDE 20 MG/ML
INJECTION, SOLUTION INFILTRATION; PERINEURAL PRN
Status: DISCONTINUED | OUTPATIENT
Start: 2024-05-31 | End: 2024-05-31

## 2024-05-31 RX ORDER — HYDROMORPHONE HYDROCHLORIDE 1 MG/ML
0.2 INJECTION, SOLUTION INTRAMUSCULAR; INTRAVENOUS; SUBCUTANEOUS EVERY 5 MIN PRN
Status: DISCONTINUED | OUTPATIENT
Start: 2024-05-31 | End: 2024-06-01 | Stop reason: HOSPADM

## 2024-05-31 RX ORDER — PROPOFOL 10 MG/ML
INJECTION, EMULSION INTRAVENOUS CONTINUOUS PRN
Status: DISCONTINUED | OUTPATIENT
Start: 2024-05-31 | End: 2024-05-31

## 2024-05-31 RX ORDER — NALOXONE HYDROCHLORIDE 0.4 MG/ML
0.2 INJECTION, SOLUTION INTRAMUSCULAR; INTRAVENOUS; SUBCUTANEOUS
Status: DISCONTINUED | OUTPATIENT
Start: 2024-05-31 | End: 2024-06-01 | Stop reason: HOSPADM

## 2024-05-31 RX ORDER — OXYCODONE HYDROCHLORIDE 5 MG/1
5 TABLET ORAL EVERY 6 HOURS PRN
Qty: 6 TABLET | Refills: 0 | Status: SHIPPED | OUTPATIENT
Start: 2024-05-31 | End: 2024-06-03

## 2024-05-31 RX ORDER — SODIUM CHLORIDE, SODIUM LACTATE, POTASSIUM CHLORIDE, CALCIUM CHLORIDE 600; 310; 30; 20 MG/100ML; MG/100ML; MG/100ML; MG/100ML
INJECTION, SOLUTION INTRAVENOUS CONTINUOUS
Status: DISCONTINUED | OUTPATIENT
Start: 2024-05-31 | End: 2024-06-01 | Stop reason: HOSPADM

## 2024-05-31 RX ORDER — ONDANSETRON 2 MG/ML
INJECTION INTRAMUSCULAR; INTRAVENOUS PRN
Status: DISCONTINUED | OUTPATIENT
Start: 2024-05-31 | End: 2024-05-31

## 2024-05-31 RX ORDER — FLUMAZENIL 0.1 MG/ML
0.2 INJECTION, SOLUTION INTRAVENOUS
Status: DISCONTINUED | OUTPATIENT
Start: 2024-05-31 | End: 2024-06-01 | Stop reason: HOSPADM

## 2024-05-31 RX ORDER — FENTANYL CITRATE 50 UG/ML
50 INJECTION, SOLUTION INTRAMUSCULAR; INTRAVENOUS EVERY 5 MIN PRN
Status: DISCONTINUED | OUTPATIENT
Start: 2024-05-31 | End: 2024-06-01 | Stop reason: HOSPADM

## 2024-05-31 RX ORDER — OXYCODONE HYDROCHLORIDE 5 MG/1
10 TABLET ORAL
Status: DISCONTINUED | OUTPATIENT
Start: 2024-05-31 | End: 2024-06-01 | Stop reason: HOSPADM

## 2024-05-31 RX ORDER — OXYCODONE HYDROCHLORIDE 5 MG/1
5 TABLET ORAL
Status: DISCONTINUED | OUTPATIENT
Start: 2024-05-31 | End: 2024-06-01 | Stop reason: HOSPADM

## 2024-05-31 RX ORDER — ONDANSETRON 2 MG/ML
4 INJECTION INTRAMUSCULAR; INTRAVENOUS EVERY 30 MIN PRN
Status: DISCONTINUED | OUTPATIENT
Start: 2024-05-31 | End: 2024-06-01 | Stop reason: HOSPADM

## 2024-05-31 RX ORDER — PROPOFOL 10 MG/ML
INJECTION, EMULSION INTRAVENOUS PRN
Status: DISCONTINUED | OUTPATIENT
Start: 2024-05-31 | End: 2024-05-31

## 2024-05-31 RX ORDER — DEXAMETHASONE SODIUM PHOSPHATE 10 MG/ML
4 INJECTION, SOLUTION INTRAMUSCULAR; INTRAVENOUS
Status: DISCONTINUED | OUTPATIENT
Start: 2024-05-31 | End: 2024-06-01 | Stop reason: HOSPADM

## 2024-05-31 RX ORDER — ONDANSETRON 4 MG/1
4 TABLET, ORALLY DISINTEGRATING ORAL EVERY 30 MIN PRN
Status: DISCONTINUED | OUTPATIENT
Start: 2024-05-31 | End: 2024-06-01 | Stop reason: HOSPADM

## 2024-05-31 RX ORDER — FENTANYL CITRATE 50 UG/ML
25-50 INJECTION, SOLUTION INTRAMUSCULAR; INTRAVENOUS
Status: DISCONTINUED | OUTPATIENT
Start: 2024-05-31 | End: 2024-06-01 | Stop reason: HOSPADM

## 2024-05-31 RX ORDER — FENTANYL CITRATE 50 UG/ML
25 INJECTION, SOLUTION INTRAMUSCULAR; INTRAVENOUS EVERY 5 MIN PRN
Status: DISCONTINUED | OUTPATIENT
Start: 2024-05-31 | End: 2024-06-01 | Stop reason: HOSPADM

## 2024-05-31 RX ORDER — LIDOCAINE 40 MG/G
CREAM TOPICAL
Status: DISCONTINUED | OUTPATIENT
Start: 2024-05-31 | End: 2024-06-01 | Stop reason: HOSPADM

## 2024-05-31 RX ORDER — BUPIVACAINE HYDROCHLORIDE 5 MG/ML
INJECTION, SOLUTION EPIDURAL; INTRACAUDAL
Status: COMPLETED | OUTPATIENT
Start: 2024-05-31 | End: 2024-05-31

## 2024-05-31 RX ADMIN — SODIUM CHLORIDE, SODIUM LACTATE, POTASSIUM CHLORIDE, CALCIUM CHLORIDE: 600; 310; 30; 20 INJECTION, SOLUTION INTRAVENOUS at 11:21

## 2024-05-31 RX ADMIN — BUPIVACAINE HYDROCHLORIDE 25 ML: 5 INJECTION, SOLUTION EPIDURAL; INTRACAUDAL at 11:38

## 2024-05-31 RX ADMIN — ONDANSETRON 4 MG: 2 INJECTION INTRAMUSCULAR; INTRAVENOUS at 12:59

## 2024-05-31 RX ADMIN — PROPOFOL 50 MCG/KG/MIN: 10 INJECTION, EMULSION INTRAVENOUS at 13:50

## 2024-05-31 RX ADMIN — PROPOFOL 150 MCG/KG/MIN: 10 INJECTION, EMULSION INTRAVENOUS at 12:59

## 2024-05-31 RX ADMIN — LIDOCAINE HYDROCHLORIDE 80 MG: 20 INJECTION, SOLUTION INFILTRATION; PERINEURAL at 12:59

## 2024-05-31 RX ADMIN — ACETAMINOPHEN 975 MG: 325 TABLET ORAL at 10:38

## 2024-05-31 RX ADMIN — PROPOFOL 50 MG: 10 INJECTION, EMULSION INTRAVENOUS at 12:59

## 2024-05-31 NOTE — OR NURSING
Patient received left side Brachial Plexus nerve block  without Exparel. Versed 1mg given. Tolerated procedure well.

## 2024-05-31 NOTE — DISCHARGE INSTRUCTIONS
Procedure Performed: Left cubital tunnel release  Attending Surgeon: George Garrett MD  Date: 5/31/2024    DIAGNOSIS  1. Entrapment of left ulnar nerve        MEDICATIONS   Resume all home medications as directed unless otherwise instructed during this hospitalization. If there is any question, double check with your primary care provider.  Start new discharge medications as directed.    Take 1 tablet of 650 mg Tylenol (acetaminophen) Arthritis Strength (extended release) and 1 tablet of Aleve (naproxen) 220 mg in the morning with breakfast and in the evening with dinner.     For breakthrough pain use narcotic pain medication as prescribed.    Do not drive or operate machinery while taking narcotic pain medications.   If you are taking other Tylenol containing medicines at home, be sure NOT to exceed 4 gram's (4000 milligrams) of Tylenol per day.   If you are taking pain medications, be sure to take Colace (docusate sodium) as well to prevent constipation. If constipated, try adding another cathartic or enema.  If nausea and vomiting, call the hospital or seek medical attention.    ACTIVITY   Weight bearing: Non-weight bearing to arm.    DIET  Resume same diet prior to your hospital admission.    WOUND   Leave dressing on until you are seen in clinic for your follow up visit.   Watch for signs and symptoms of infection of your wounds including; pain, redness, swelling, drainage or fever.  If you notice any of these symptoms please call or seek medical attention.    Keep wound clean, dry, and intact.  Do not submerge wounds in water until they are healed. No baths, soaking, swimming, or prolonged water exposure for 4 weeks after surgery.    RETURN   Follow-up with Orthopedic Clinic as directed.     Future Appointments   Date Time Provider Department Center   6/6/2024  7:45 AM [facility]   6/10/2024  9:20 AM Farhat Zheng PA-C [facility]   6/11/2024  9:00 AM Fawn White  "ROSA CNP [facility] [place]   7/23/2024  8:30 AM Lewis Atkinson MD [facility]       Call the [facility] at [phone] during business hours for any symptoms such as:    * Fevers with Temperature greater than 101.5 degrees.   * Pus drainage from wound site.   * Severe pain, not controlled by medication.   * Persistent nausea, vomiting and inablility to tolerate fluids.    If you are receiving care in [place], you may call the Orthopedic clinic at [phone].    FOR URGENT PROBLEMS ONLY, after hours or on weekends call the hospital  at [phone] and ask to speak with the orthopedic resident on call.    [facility]  Home Care Following Anesthesia  For 24 hours after surgery:  Get plenty of rest.  A responsible adult must stay with you for at least 24 hours after you leave the surgery center.  Do not drive or use heavy equipment.  If you have weakness or tingling, don't drive or use heavy equipment until this feeling goes away.   Do not drink alcohol.   Avoid strenuous or risky activities.  Ask for help when climbing stairs.  You may feel lightheaded.  IF so, sit for a few minutes before standing.  Have someone help you get up.   If you have nausea (feel sick to your stomach): Drink only clear liquids such as apple juice, ginger ale, broth or 7-Up.  Rest may also help.  Be sure to drink enough fluids.  Move to a regular diet as you feel able.   You may have a slight fever.  Call the doctor if your fever is over 100 F (37.7 C) (taken under the tongue) or lasts longer than 24 hours.  You may have a dry mouth, a sore throat, muscle aches or trouble sleeping. These should go away after 24 hours.  Do not make important or legal decisions.   It is recommended to avoid smoking.        Today you received an Exparel block to numb the nerves near your surgery site.  This is a block using local anesthetic or \"numbing\" medication injected around the " "nerves to anesthetize or \"numb\" the area supplied by those nerves.  This block is injected into the muscle layer near your surgical site.  This medication may numb the location where you had surgery up to 72 hours.  If your surgical site is an arm or leg you should be careful with your affected limb, since it is possible to injure your limb without being aware of it due to the numbing.  Until full feeling returns, you should guard against bumping or hitting your limb, and avoid extreme hot or cold temperatures on the skin.  As the block wears off, the feeling will return as a tingling or prickly sensation near your surgical site.  You will experince more discomfort from your incision as the feeling returns.  You may want to take a pain pill (a narcotic or Tylenol if this was prescribed by your surgeon) when you start to experience mild pain before the pain beomes more severe.  If your pain medications do not control your pain, you should notify your surgeon.    Tips for taking pain medications  To get the best pain relief possible, remember these points:  Take pain medications as directed, before pain becomes severe.  Pain medication can upset your stomach: taking it with food may help.  Constipation is a common side effect of pain medication. Drink plenty of  fluids.  Eat foods high in fiber. Take a stool softener if recommended by your doctor or pharmacist.  Do not drink alcohol, drive or operate machinery while taking pain medications.  Ask about other ways to control pain, such as with heat, ice or relaxation.    Tylenol/Acetaminophen Consumption    If you feel your pain relief is insufficient, you may take Tylenol/Acetaminophen in addition to your narcotic pain medication.   Be careful not to exceed 4,000 mg of Tylenol/Acetaminophen in a 24 hour period from all sources.  If you are taking extra strength Tylenol/acetaminophen (500 mg), the maximum dose is 8 tablets in 24 hours.  If you are taking regular " strength acetaminophen (325 mg), the maximum dose is 12 tablets in 24 hours.    Call a doctor for any of the following:  Signs of infection (fever, growing tenderness at the surgery site, a large amount of drainage or bleeding, severe pain, foul-smelling drainage, redness, swelling).  It has been over 8 to 10 hours since surgery and you are still not able to urinate (pass water).  Headache for over 24 hours.  Numbness, tingling or weakness the day after surgery (if you had spinal anesthesia).  Signs of Covid-19 infection (temperature over 100 degrees, shortness of breath, cough, loss of taste/smell, generalized body aches, persistent headache, chills, sore throat, nausea/vomiting/diarrhea)  Your doctor is:       Dr. George Garrett, Orthopaedics: [phone]               Or dial [phone] and ask for the resident on call for:  Orthopaedics  For emergency care, call the:  [facility] Emergency Department: [phone] (TTY for hearing impaired: [phone])  Tylenol 975 mg given at 1040 am.  Next available dose at 440 pm.

## 2024-05-31 NOTE — ANESTHESIA PREPROCEDURE EVALUATION
"Anesthesia Pre-Procedure Evaluation    Patient: Corey Gutierrez   MRN: [id number] : 1955        Procedure : Procedure(s):  RELEASE, CUBITAL TUNNEL LEFT          No past medical history on file.   History reviewed. No pertinent surgical history.   Allergies   Allergen Reactions    Statins      myalgias      Social History     Tobacco Use    Smoking status: Never     Passive exposure: Never    Smokeless tobacco: Never   Substance Use Topics    Alcohol use: Not on file      Wt Readings from Last 1 Encounters:   24 84.4 kg (186 lb 1.6 oz)        Anesthesia Evaluation   Pt has had prior anesthetic. Type: General.        ROS/MED HX  ENT/Pulmonary:       Neurologic: Comment: Ulnar neuropathy of left upper extremity      Cardiovascular:     (+) Dyslipidemia hypertension- -   -  - -                                      METS/Exercise Tolerance:     Hematologic:       Musculoskeletal:       GI/Hepatic:       Renal/Genitourinary:    (-) renal disease   Endo: Comment: Prediabetes  Well-controlled on metformin, last A1c 6.0% on 2024      Psychiatric/Substance Use:     (+) psychiatric history anxiety       Infectious Disease:       Malignancy:   (+) Malignancy, History of Prostate.    Other:            Physical Exam    Airway        Mallampati: II   TM distance: > 3 FB   Neck ROM: full   Mouth opening: > 3 cm    Respiratory Devices and Support         Dental       (+) Modest Abnormalities - crowns, retainers, 1 or 2 missing teeth      Cardiovascular   cardiovascular exam normal          Pulmonary   pulmonary exam normal                OUTSIDE LABS:  CBC:   Lab Results   Component Value Date    WBC 4.1 2024    HGB 12.8 (L) 2024    HCT 38.1 (L) 2024     2024     BMP: No results found for: \"NA\", \"POTASSIUM\", \"CHLORIDE\", \"CO2\", \"BUN\", \"CR\", \"GLC\"  COAGS: No results found for: \"PTT\", \"INR\", \"FIBR\"  POC: No results found for: \"BGM\", \"HCG\", \"HCGS\"  HEPATIC: No results found for: " "\"ALBUMIN\", \"PROTTOTAL\", \"ALT\", \"AST\", \"GGT\", \"ALKPHOS\", \"BILITOTAL\", \"BILIDIRECT\", \"YRIS\"  OTHER: No results found for: \"PH\", \"LACT\", \"A1C\", \"ALEJANDRO\", \"PHOS\", \"MAG\", \"LIPASE\", \"AMYLASE\", \"TSH\", \"T4\", \"T3\", \"CRP\", \"SED\"    Anesthesia Plan    ASA Status:  3    NPO Status:  NPO Appropriate    Anesthesia Type: MAC.     - Reason for MAC: immobility needed   Induction: Intravenous.   Maintenance: TIVA.        Consents    Anesthesia Plan(s) and associated risks, benefits, and realistic alternatives discussed. Questions answered and patient/representative(s) expressed understanding.     - Discussed: Risks, Benefits and Alternatives for BOTH SEDATION and the PROCEDURE were discussed     - Discussed with:  Patient      - Extended Intubation/Ventilatory Support Discussed: No.      - Patient is DNR/DNI Status: No     Use of blood products discussed: No .     Postoperative Care    Pain management: IV analgesics, Oral pain medications, Peripheral nerve block (Single Shot).   PONV prophylaxis: Ondansetron (or other 5HT-3), Dexamethasone or Solumedrol, Background Propofol Infusion     Comments:               Dave Noriega MD    I have reviewed the pertinent notes and labs in the chart from the past 30 days and (re)examined the patient.  Any updates or changes from those notes are reflected in this note.              # Overweight: Estimated body mass index is 28.3 kg/m  as calculated from the following:    Height as of 5/6/24: 1.727 m (5' 8\").    Weight as of 5/6/24: 84.4 kg (186 lb 1.6 oz).      "

## 2024-05-31 NOTE — BRIEF OP NOTE
[facility] Brief Operative Note    Pre-operative diagnosis: Ulnar neuropathy of left upper extremity [G56.22]   Post-operative diagnosis Same   Procedure: Procedure(s):  RELEASE, CUBITAL TUNNEL LEFT   Surgeon(s): Surgeons and Role:     * George Garrett MD - Primary     * Amador Ellison MD - Resident - Assisting   Estimated blood loss: * No values recorded between 5/31/2024  1:16 PM and 5/31/2024  2:17 PM *    Specimens: * No specimens in log *   Findings: See dictated operative report       Same day surgery  Keep dressing c/d/I  Follow up as scheduled

## 2024-05-31 NOTE — ANESTHESIA CARE TRANSFER NOTE
Patient: Corey Gutierrez    Procedure: Procedure(s):  RELEASE, CUBITAL TUNNEL LEFT       Diagnosis: Ulnar neuropathy of left upper extremity [G56.22]  Diagnosis Additional Information: No value filed.    Anesthesia Type:   MAC     Note:    Oropharynx: oropharynx clear of all foreign objects and spontaneously breathing  Level of Consciousness: awake  Oxygen Supplementation: room air    Independent Airway: airway patency satisfactory and stable  Dentition: dentition unchanged  Vital Signs Stable: post-procedure vital signs reviewed and stable  Report to RN Given: handoff report given  Patient transferred to: Phase II    Handoff Report: Identifed the Patient, Identified the Reponsible Provider, Reviewed the pertinent medical history, Discussed the surgical course, Reviewed Intra-OP anesthesia mangement and issues during anesthesia, Set expectations for post-procedure period and Allowed opportunity for questions and acknowledgement of understanding      Vitals:  Vitals Value Taken Time   /62    Temp     Pulse 62    Resp 13    SpO2 96%        Electronically Signed By: ROSA Gambino CRNA  May 31, 2024  2:20 PM

## 2024-05-31 NOTE — OP NOTE
Patient: Corey Gutierrez  : 1955  MRN: [id number]    DATE OF OPERATION: May 31, 2024      OPERATIVE REPORT       PREOPERATIVE DIAGNOSIS:  Left cubital tunnel syndrome     POSTOPERATIVE DIAGNOSIS:  Left cubital tunnel syndrome     PROCEDURE:  Left in-situ cubital tunnel release    SURGEON:  George Garrett MD     ASSISTANT(S):  Amador Ellison MD    ANESTHESIA:  Regional + MAC     IMPLANTS:   None    ESTIMATED BLOOD LOSS:  5cc    DVT PROPHYLAXIS:  SCDs    TOURNIQUET TIME:  31 minutes     SPECIMENS REMOVED:  None    INTRAOPERATIVE FINDINGS:  Compressed ulnar nerve at the cubital tunnel    COMPLICATIONS:  None    DISPOSITION:  Stable to PACU.     INDICATIONS:  Corey Gutierrez is a 69 year old male with longstanding symptoms of ulnar neuropathy and electrodiagnostic findings consistent with cubital tunnel syndrome.    The indications for surgery were discussed with the patient. The benefits, risks, and alternatives of operative management were discussed in detail with the patient. The patient understands that the risks of surgery include, but are not limited to: infection, bleeding, injury to nearby structures (such as nerves, blood vessels, and tendons), incomplete resolution of symptoms, need for additional surgery, pain, stiffness, scarring, need for rehabilitation, and anesthetic complications.   I did advise that given his EMG findings, he may not experience notable improvement in sensation upwards of 8 months after surgery.  While the EMG states that it could not exclude neuropathy within the hand.  I think this is less likely when looking at the nerve conduction studies.  I therefore advised proceeding only with a cubital tunnel release.  If he has persistent symptoms, we may consider releasing Guyon's in the future.  Patient expressed understanding and elected to proceed with surgery. All questions were answered to the patient's satisfaction.      Consent was obtained for the procedure.     DESCRIPTION OF PROCEDURE  IN DETAIL:  The patient was seen in the preoperative care unit. Patient identity, consent, procedure to be performed, and operative site were verified with the patient. The left upper extremity was marked. The regional anesthesia team performed a preoperative block.     The patient was brought to the operating room and placed supine on the operating room table. The left upper extremity was placed on an armboard. SCDs were placed on the bilateral lower extremities. A well-padded tourniquet was placed on the upper arm.     Sedation was administered by anesthesia.     The left upper extremity was prepped and draped in the usual sterile fashion. A timeout was performed confirming the correct patient, procedure, and operative site. All were in agreement.    The limb was exsanguinated and tourniquet inflated to 250 mmHg.  A curvilinear incision was made directly over the cubital tunnel extending proximally along the course of the ulnar nerve and distally over the FCU.  Blunt dissection was carried down through the skin and subcutaneous tissues to the medial epicondyle.  Branches of the medial antebrachial cutaneous nerve were identified and protected throughout the remainder of the case.  The ulnar nerve was identified proximal to the cubital tunnel posterior to the intermuscular septum and followed distally into the cubital tunnel.  Perry's ligament was incised sharply.  The ulnar nerve was then followed distally into the FCU.  The FCU fascia was released and the 2 heads of the FCU were developed bluntly.  The motor branches of the ulnar nerve to the FCU were identified and protected.  Distally, the deep flexor pronator fascia was also released under direct visualization.  Attention was then turned to the ulnar nerve proximally.  It was followed along the intermuscular septum until the arcade of [place] was identified.  These fibers were released under direct visualization.  A complete ulnar nerve release was  performed and the nerve was found to have excellent excursion.      The elbow was taken through a range of motion.  The ulnar nerve was found to be stable.  As such, the nerve was left in situ and no transposition was performed.    The wound was then copiously irrigated.  The tourniquet was deflated.  Hemostasis was achieved with bipolar cautery.  The deep dermal tissues were then closed with 3-0 Vicryl interrupted buried sutures.  The skin was then closed with running 4-0 Monocryl in subcuticular fashion.    Steri-Strips were applied.  A sterile dressing was then applied.     All needle and sponge counts were correct at the end of the procedure. There were no complications.     The patient was awoken from anesthesia and taken to the postoperative recovery unit in stable condition.     I was present and scrubbed for the entire procedure.     POSTOPERATIVE PLAN:  The patient will be discharged home.  The patient has been instructed to be 1 lb coffee cup weight bearing.  I have instructed the patient on finger and elbow range of motion.  The patient will return to clinic in 10 days at which point the wound will be checked and the patient may progress activities.    George Garrett MD     Hand, Upper Extremity & Microvascular Surgery  Department of [facility]  [facility]

## 2024-05-31 NOTE — ANESTHESIA POSTPROCEDURE EVALUATION
Patient: Corey Gutierrez    Procedure: Procedure(s):  RELEASE, CUBITAL TUNNEL LEFT       Anesthesia Type:  MAC    Note:  Disposition: Outpatient   Postop Pain Control: Uneventful            Sign Out: Well controlled pain   PONV: No   Neuro/Psych: Uneventful            Sign Out: Acceptable/Baseline neuro status   Airway/Respiratory: Uneventful            Sign Out: Acceptable/Baseline resp. status   CV/Hemodynamics: Uneventful            Sign Out: Acceptable CV status; No obvious hypovolemia; No obvious fluid overload   Other NRE: NONE   DID A NON-ROUTINE EVENT OCCUR?            Last vitals:  Vitals Value Taken Time   /61 05/31/24 1423   Temp 36.1  C (97  F) 05/31/24 1436   Pulse 59 05/31/24 1436   Resp 16 05/31/24 1436   SpO2 96 % 05/31/24 1436       Electronically Signed By: Ash Obrien MD  May 31, 2024  3:05 PM

## 2024-05-31 NOTE — ANESTHESIA PROCEDURE NOTES
"Brachial plexus Procedure Note    Pre-Procedure   Staff -        Anesthesiologist:  Mario Patel MD       Resident/Fellow: Dave Noriega MD       Performed By: resident       Location: pre-op       Procedure Start/Stop Times: 5/31/2024 11:38 AM       Pre-Anesthestic Checklist: patient identified, IV checked, site marked, risks and benefits discussed, informed consent, monitors and equipment checked, pre-op evaluation, at physician/surgeon's request and post-op pain management  Timeout:       Correct Patient: Yes        Correct Procedure: Yes        Correct Site: Yes        Correct Position: Yes        Correct Laterality: Yes        Site Marked: Yes  Procedure Documentation  Procedure: Brachial plexus       Laterality: right       Patient Position: sitting       Patient Prep/Sterile Barriers: sterile gloves, mask       Skin prep: Chloraprep (supraclavicular approach).       Needle Type: short bevel       Needle Gauge: 21.        Needle Length (millimeters): 110        Ultrasound guided       1. Ultrasound was used to identify targeted nerve, plexus, vascular marker, or fascial plane and place a needle adjacent to it in real-time.       2. Ultrasound was used to visualize the spread of anesthetic in close proximity to the above referenced structure.       3. A permanent image is entered into the patient's record.    Assessment/Narrative         The placement was negative for: blood aspirated, painful injection and site bleeding       Paresthesias: No.       Bolus given via needle..        Secured via.        Insertion/Infusion Method: Single Shot    Medication(s) Administered   Bupivacaine 0.5% PF (Infiltration) - Infiltration   25 mL - 5/31/2024 11:38:00 AM  Medication Administration Time: 5/31/2024 11:38 AM      FOR [facility] ([facility]) ONLY:   Pain Team Contact information: please page the Pain Team [employer]. Search \"Pain\". During daytime hours, please page the attending first. At night please " page the resident first.

## 2024-06-06 ENCOUNTER — LAB (OUTPATIENT)
Dept: LAB | Facility: CLINIC | Age: 69
End: 2024-06-06
Attending: STUDENT IN AN ORGANIZED HEALTH CARE EDUCATION/TRAINING PROGRAM
Payer: MEDICARE

## 2024-06-06 DIAGNOSIS — D64.9 ANEMIA, UNSPECIFIED TYPE: ICD-10-CM

## 2024-06-06 DIAGNOSIS — D64.9 NORMOCYTIC ANEMIA: Primary | ICD-10-CM

## 2024-06-06 LAB
BASOPHILS # BLD AUTO: 0 10E3/UL (ref 0–0.2)
BASOPHILS NFR BLD AUTO: 1 %
EOSINOPHIL # BLD AUTO: 0.2 10E3/UL (ref 0–0.7)
EOSINOPHIL NFR BLD AUTO: 5 %
ERYTHROCYTE [DISTWIDTH] IN BLOOD BY AUTOMATED COUNT: 12.3 % (ref 10–15)
HCT VFR BLD AUTO: 38.6 % (ref 40–53)
HGB BLD-MCNC: 13.1 G/DL (ref 13.3–17.7)
IMM GRANULOCYTES # BLD: 0 10E3/UL
IMM GRANULOCYTES NFR BLD: 0 %
LYMPHOCYTES # BLD AUTO: 1.1 10E3/UL (ref 0.8–5.3)
LYMPHOCYTES NFR BLD AUTO: 25 %
MCH RBC QN AUTO: 32 PG (ref 26.5–33)
MCHC RBC AUTO-ENTMCNC: 33.9 G/DL (ref 31.5–36.5)
MCV RBC AUTO: 94 FL (ref 78–100)
MONOCYTES # BLD AUTO: 0.4 10E3/UL (ref 0–1.3)
MONOCYTES NFR BLD AUTO: 10 %
NEUTROPHILS # BLD AUTO: 2.5 10E3/UL (ref 1.6–8.3)
NEUTROPHILS NFR BLD AUTO: 58 %
PLATELET # BLD AUTO: 229 10E3/UL (ref 150–450)
RBC # BLD AUTO: 4.1 10E6/UL (ref 4.4–5.9)
WBC # BLD AUTO: 4.3 10E3/UL (ref 4–11)

## 2024-06-06 PROCEDURE — 36415 COLL VENOUS BLD VENIPUNCTURE: CPT

## 2024-06-06 PROCEDURE — 85025 COMPLETE CBC W/AUTO DIFF WBC: CPT

## 2024-06-06 ASSESSMENT — SLEEP AND FATIGUE QUESTIONNAIRES
HOW LIKELY ARE YOU TO NOD OFF OR FALL ASLEEP WHEN YOU ARE A PASSENGER IN A CAR FOR AN HOUR WITHOUT A BREAK: MODERATE CHANCE OF DOZING
HOW LIKELY ARE YOU TO NOD OFF OR FALL ASLEEP IN A CAR, WHILE STOPPED FOR A FEW MINUTES IN TRAFFIC: WOULD NEVER DOZE
HOW LIKELY ARE YOU TO NOD OFF OR FALL ASLEEP WHILE SITTING AND READING: SLIGHT CHANCE OF DOZING
HOW LIKELY ARE YOU TO NOD OFF OR FALL ASLEEP WHILE WATCHING TV: SLIGHT CHANCE OF DOZING
HOW LIKELY ARE YOU TO NOD OFF OR FALL ASLEEP WHILE SITTING QUIETLY AFTER LUNCH WITHOUT ALCOHOL: SLIGHT CHANCE OF DOZING
HOW LIKELY ARE YOU TO NOD OFF OR FALL ASLEEP WHILE LYING DOWN TO REST IN THE AFTERNOON WHEN CIRCUMSTANCES PERMIT: HIGH CHANCE OF DOZING
HOW LIKELY ARE YOU TO NOD OFF OR FALL ASLEEP WHILE SITTING AND TALKING TO SOMEONE: WOULD NEVER DOZE
HOW LIKELY ARE YOU TO NOD OFF OR FALL ASLEEP WHILE SITTING INACTIVE IN A PUBLIC PLACE: WOULD NEVER DOZE

## 2024-06-07 NOTE — PROGRESS NOTES
"HISTORY OF PRESENT ILLNESS:    Corey Gutierrez is a 69 year old male who is seen in follow up for 5/30/24 cubital tunnel release with Dr Garrett.  Present symptoms: feels like it is continuously improving, the numbness has continued to subside. Wondering about his level of activity.   Denies Chest pain, Calve pain, Fever, Chills.    Current Treatment: soft dressing, restrictions.    PHYSICAL EXAM:  Ht 1.727 m (5' 8\")   Wt 84.8 kg (187 lb)   BMI 28.43 kg/m    Body mass index is 28.43 kg/m .   GENERAL APPEARANCE: healthy, alert, and no distress   PSYCH:  mentation appears normal and affect normal/bright    MSK:  Left: elbow .  Ambulates: no assistive devices..  Incision clean and dry, sub cutaneous closure present, healing.  Appropriate incisional erythema.   Ecchymosis resolving at LUE>.  Edema min at LUE  CMS: breana incisional numbness, otherwise grossly intact.  AROM elbow full and symmetric..       ASSESSMENT:  Corey Gutierrez is a 69 year old male S/P Left cubital tunnel insitu decompression..    Healing incision.  Symptom improvement.    Discussed nerve can continue to improved for months.    PLAN:  - Surgery discussed, images reviewed if applicable, and all questions were answered at this time.  - dressing removed with sterile technique, steri-strips left in place.     - care instructions given and verbally acknowledged.  - Medications: OTC PRN.  - no therapy needed at this time.      Return to clinic 5-6 months if issues.    Farhat Zheng PA-C    Dept. Orthopedic Surgery  [facility]   6/10/2024   "

## 2024-06-10 ENCOUNTER — OFFICE VISIT (OUTPATIENT)
Dept: ORTHOPEDICS | Facility: CLINIC | Age: 69
End: 2024-06-10
Payer: MEDICARE

## 2024-06-10 VITALS
BODY MASS INDEX: 28.34 KG/M2 | SYSTOLIC BLOOD PRESSURE: 128 MMHG | HEIGHT: 68 IN | DIASTOLIC BLOOD PRESSURE: 74 MMHG | WEIGHT: 187 LBS

## 2024-06-10 DIAGNOSIS — Z47.89 ORTHOPEDIC AFTERCARE: Primary | ICD-10-CM

## 2024-06-10 PROCEDURE — 99024 POSTOP FOLLOW-UP VISIT: CPT | Performed by: PHYSICIAN ASSISTANT

## 2024-06-10 NOTE — PATIENT INSTRUCTIONS
Incision Care:  Showering is okay at this time, however no soaking, submerging or scrubbing of incision until all scabs have resolved.  Any applied Steri-strips will most likely fall off on their own, however they may be removed after 1 weeks with rubbing alcohol if they have not.    If there is no draining or bleeding, the tape-strips or clean garments are enough coverage unless you were instructed otherwise or you would like to cover for comfort.  If drainage or bleeding occurs, please cover the incision with clean dressings.  If drainage or bleeding is significant or does not stop within 48 hours please notify the office.    Gradually increase your activities as you can tolerated them, starting at a level well below what you would normally do.     Scar tissue may develop and be present at or deep to the incision site for several weeks to months which may feel like a lump. Gentle massage to the area when tolerated may help reduce this.   Also the top layers of skin may peel away over the next weeks.    As we discussed, nerves are very slow to heal, and can improve for several months.    Follow up as needed in clinic.  I would also recommend at  6 months from surgery if not significantly recovered.

## 2024-06-10 NOTE — Clinical Note
"6/10/2024      Corey Gutierrez  [address] [facility] [address]      Dear Colleague,    Thank you for referring your patient, Corey Gutierrez, to the [facility] [place]. Please see a copy of my visit note below.    HISTORY OF PRESENT ILLNESS:    Corey Gutierrez is a 69 year old male who is seen in follow up for 5/30/24 cubital tunnel release with Dr Garrett.  Present symptoms: feels like it is continuously improving, the numbness has continued to subside. Wondering about his level of activity.   Denies Chest pain, Calve pain, Fever, Chills.    Current Treatment: ***    PHYSICAL EXAM:  Ht 1.727 m (5' 8\")   Wt 84.8 kg (187 lb)   BMI 28.43 kg/m    Body mass index is 28.43 kg/m .   GENERAL APPEARANCE: {ANDRIA GENERAL APPEARANCE:50::\"healthy\",\"alert\",\"no distress\"}   PSYCH:  {ANDRIA EXAM CPE - PSYCH:357543::\"mentation appears normal\",\"affect normal/bright\"}    MSK:  {RIGHT:838327} .  Ambulates: ***.  Incision clean and dry, *** present, healing.  Appropriate incisional erythema.   {YES/NO -default:843645} Ecchymosis ***.  {YES/NO -default:958613} calve pain on palpation.  Edema ***  CMS: breana incisional numbness, otherwise grossly intact.  AROM ***.      IMAGING INTERPRETATION:  ***.  Images read and interpreted by me***     ASSESSMENT:  Corey Gutierrez is a 69 year old male S/P ***.  ***    PLAN:  - Surgery discussed, images reviewed if applicable, and all questions were answered at this time.  - *** removed with sterile technique, steri-strips applied in usual fashion, care instructions given and verbally acknowledged.  - Medications: ***  - {REHAB :987640}  - ***    Return to clinic {RTC WHEN:313370}    Farhat Zheng PA-C    Dept. Orthopedic Surgery  [facility]   6/10/2024     HISTORY OF PRESENT ILLNESS:    Corey Gutierrez is a 69 year old male who is seen in follow up for 5/30/24 cubital tunnel release with Dr Garrett.  Present symptoms: feels like it is continuously improving, " "the numbness has continued to subside. Wondering about his level of activity.   Denies Chest pain, Calve pain, Fever, Chills.    Current Treatment: soft dressing, restritions.    PHYSICAL EXAM:  Ht 1.727 m (5' 8\")   Wt 84.8 kg (187 lb)   BMI 28.43 kg/m    Body mass index is 28.43 kg/m .   GENERAL APPEARANCE: healthy, alert, and no distress   PSYCH:  mentation appears normal and affect normal/bright    MSK:  Left: elbow .  Ambulates: no assitive devices..  Incision clean and dry, sub cutaneous closure present, healing.  Appropriate incisional erythema.   Ecchymosis resovling at LUE>.  Edema min at LUE  CMS: breana incisional numbness, otherwise grossly intact.  AROM elbow full and symmetric..       ASSESSMENT:  Corey Gutierrez is a 69 year old male S/P Left cubital tunnel insitu decompression..  ***    PLAN:  - Surgery discussed, images reviewed if applicable, and all questions were answered at this time.  - *** removed with sterile technique, steri-strips applied in usual fashion, care instructions given and verbally acknowledged.  - Medications: ***  - {REHAB :364822}  - ***    Return to clinic {RTC WHEN:867180}    Farhat Zheng PA-C    Dept. Orthopedic Surgery  [facility]   6/10/2024       Again, thank you for allowing me to participate in the care of your patient.        Sincerely,        Farhat Zheng PA-C  "

## 2024-06-11 ENCOUNTER — VIRTUAL VISIT (OUTPATIENT)
Dept: SLEEP MEDICINE | Facility: CLINIC | Age: 69
End: 2024-06-11
Payer: MEDICARE

## 2024-06-11 ENCOUNTER — TELEPHONE (OUTPATIENT)
Dept: FAMILY MEDICINE | Facility: CLINIC | Age: 69
End: 2024-06-11

## 2024-06-11 VITALS — BODY MASS INDEX: 26.98 KG/M2 | WEIGHT: 178 LBS | HEIGHT: 68 IN

## 2024-06-11 DIAGNOSIS — I10 ESSENTIAL HYPERTENSION: ICD-10-CM

## 2024-06-11 DIAGNOSIS — G47.33 OSA (OBSTRUCTIVE SLEEP APNEA): Primary | ICD-10-CM

## 2024-06-11 DIAGNOSIS — R73.03 PREDIABETES: ICD-10-CM

## 2024-06-11 PROCEDURE — 99203 OFFICE O/P NEW LOW 30 MIN: CPT | Mod: 95 | Performed by: NURSE PRACTITIONER

## 2024-06-11 NOTE — NURSING NOTE
Is the patient currently in the state of [place]? YES    Visit mode:VIDEO    If the visit is dropped, the patient can be reconnected by: VIDEO VISIT: Send to e-mail at: [email or web address]    Will anyone else be joining the visit? NO  (If patient encounters technical issues they should call [phone]    How would you like to obtain your AVS? MyChart    Are changes needed to the allergy or medication list? No    Are refills needed on medications prescribed by this physician? NO    Reason for visit: Consult    Anjel CAMACHO

## 2024-06-11 NOTE — PROGRESS NOTES
Virtual Visit Details    Type of service:  Video Visit   Video Start Time: 8:50 AM  Video End Time: 9:20 AM    Originating Location (pt. Location): Home    Distant Location (provider location):  Off-site  Platform used for Video Visit: Well

## 2024-06-11 NOTE — PATIENT INSTRUCTIONS
Your Body mass index is 27.06 kg/m .  Weight management is a personal decision.  If you are interested in exploring weight loss strategies, the following discussion covers the approaches that may be successful. Body mass index (BMI) is one way to tell whether you are at a healthy weight, overweight, or obese. It measures your weight in relation to your height.  A BMI of 18.5 to 24.9 is in the healthy range. A person with a BMI of 25 to 29.9 is considered overweight, and someone with a BMI of 30 or greater is considered obese. More than two-thirds of American adults are considered overweight or obese.  Being overweight or obese increases the risk for further weight gain. Excess weight may lead to heart disease and diabetes.  Creating and following plans for healthy eating and physical activity may help you improve your health.  Weight control is part of healthy lifestyle and includes exercise, emotional health, and healthy eating habits. Careful eating habits lifelong are the mainstay of weight control. Though there are significant health benefits from weight loss, long-term weight loss with diet alone may be very difficult to achieve- studies show long-term success with dietary management in less than 10% of people. Attaining a healthy weight may be especially difficult to achieve in those with severe obesity. In some cases, medications, devices and surgical management might be considered.  What can you do?  If you are overweight or obese and are interested in methods for weight loss, you should discuss this with your provider.   Consider reducing daily calorie intake by 500 calories.   Keep a food journal.   Avoiding skipping meals, consider cutting portions instead.    Diet combined with exercise helps maintain muscle while optimizing fat loss. Strength training is particularly important for building and maintaining muscle mass. Exercise helps reduce stress, increase energy, and improves fitness. Increasing  exercise without diet control, however, may not burn enough calories to loose weight.     Start walking three days a week 10-20 minutes at a time  Work towards walking thirty minutes five days a week   Eventually, increase the speed of your walking for 1-2 minutes at time    In addition, we recommend that you review healthy lifestyles and methods for weight loss available through the National Institutes of Health patient information sites:  http://win.niddk.nih.gov/publications/index.htm    And look into health and wellness programs that may be available through your health insurance provider, employer, local community center, or alia club.            Your Body mass index is 27.06 kg/m .  Weight management is a personal decision.  If you are interested in exploring weight loss strategies, the following discussion covers the approaches that may be successful. Body mass index (BMI) is one way to tell whether you are at a healthy weight, overweight, or obese. It measures your weight in relation to your height.  A BMI of 18.5 to 24.9 is in the healthy range. A person with a BMI of 25 to 29.9 is considered overweight, and someone with a BMI of 30 or greater is considered obese. More than two-thirds of American adults are considered overweight or obese.  Being overweight or obese increases the risk for further weight gain. Excess weight may lead to heart disease and diabetes.  Creating and following plans for healthy eating and physical activity may help you improve your health.  Weight control is part of healthy lifestyle and includes exercise, emotional health, and healthy eating habits. Careful eating habits lifelong are the mainstay of weight control. Though there are significant health benefits from weight loss, long-term weight loss with diet alone may be very difficult to achieve- studies show long-term success with dietary management in less than 10% of people. Attaining a healthy weight may be especially  difficult to achieve in those with severe obesity. In some cases, medications, devices and surgical management might be considered.  What can you do?  If you are overweight or obese and are interested in methods for weight loss, you should discuss this with your provider.   Consider reducing daily calorie intake by 500 calories.   Keep a food journal.   Avoiding skipping meals, consider cutting portions instead.    Diet combined with exercise helps maintain muscle while optimizing fat loss. Strength training is particularly important for building and maintaining muscle mass. Exercise helps reduce stress, increase energy, and improves fitness. Increasing exercise without diet control, however, may not burn enough calories to loose weight.     Start walking three days a week 10-20 minutes at a time  Work towards walking thirty minutes five days a week   Eventually, increase the speed of your walking for 1-2 minutes at time    In addition, we recommend that you review healthy lifestyles and methods for weight loss available through the National Institutes of Health patient information sites:  http://win.niddk.nih.gov/publications/index.htm    And look into health and wellness programs that may be available through your health insurance provider, employer, local community center, or alia club.

## 2024-06-11 NOTE — PROGRESS NOTES
"Chief Complaint   Patient presents with    Consult       Corey Gutierrez is a 69 year old male who returns to [facility] for review of sleep testing results. He presented with symptoms suggestive of obstructive sleep apnea.  He was referred to sleep medicine through an E consult as he had symptoms of snoring, and daytime sleepiness.    Corey has a medical history notable for hypertension, generalized anxiety, prediabetes, hyperlipidemia.    Estimated body mass index is 28.3 kg/m  as calculated from the following:    Height as of 24: 1.727 m (5' 8\").    Weight as of 24: 84.4 kg (186 lb 1.6 oz).  [place] Sleepiness Scale:   STOP-BAN/8    Analysis Time:  516.4 minutes      Respiration:   Sleep Associated Hypoxemia: sustained hypoxemia was not present. Baseline oxygen saturation was 92.1%.  Time with saturation less than or equal to 88% was 0.9 minutes. The lowest oxygen saturation was 84%.   Snoring: Snoring was present.  Respiratory events: The home study revealed a presence of 2 obstructive apneas and 13 mixed and central apneas. There were 28 hypopneas resulting in a combined apnea/hypopnea index [AHI] of 5 events per hour.  AHI was 6 per hour supine, 0 per hour prone, 0 per hour on left side, and 2.9 per hour on right side.   Pattern: Excluding events noted above, respiratory rate and pattern was Normal.        Position: Percent of time spent: supine -67.8%, prone -0%, on left -0%, on right -32.2%.        Heart Rate: By pulse oximetry normal rate was noted.     Results were reviewed in detail today with Corey and a copy given to him for his records.    Reviewed by team:  Tobacco  Allergies  Meds            Reviewed by provider:  Tobacco  Allergies  Meds  Problems  Med Hx  Surg Hx  Fam Hx             Reviewed sleep study in its entirety with patient and answered his questions to his satisfaction.    Problem List:  Patient Active Problem List    Diagnosis Date " Noted    Prostate cancer (H) 05/06/2024     Priority: Medium    Ulnar neuropathy of left upper extremity 03/19/2024     Priority: Medium    Prediabetes 02/19/2024     Priority: Medium    Essential hypertension 02/19/2024     Priority: Medium    Generalized anxiety disorder 02/19/2024     Priority: Medium    History of prostate cancer 02/19/2024     Priority: Medium    History of colectomy 02/19/2024     Priority: Medium    History of prostatectomy 02/19/2024     Priority: Medium    History of appendectomy 02/19/2024     Priority: Medium    Chronic right-sided low back pain without sciatica 02/19/2024     Priority: Medium    Sensorineural hearing loss, bilateral 02/19/2024     Priority: Medium    Snoring 02/19/2024     Priority: Medium    Lesion of left ulnar nerve 02/19/2024     Priority: Medium    Ulnar nerve compression 03/01/2023     Priority: Medium    Adjustment reaction 07/10/2020     Priority: Medium     Last Assessment & Plan:    Formatting of this note might be different from the original.   His mood has been very stable on fluoxetine.  [facility] to 60 mg daily and he also continues on the trazodone without any side effects.  He would like to have a refill of the fluoxetine 60 mg and continue the trazodone.      Umbilical hernia 07/09/2020     Priority: Medium    Sciatica 07/09/2020     Priority: Medium    Osteoarthritis of shoulder 07/09/2020     Priority: Medium     Formatting of this note might be different from the original.   PRN use of Motrin working well; reviewed NSAID use, offered celebrex if any signs GI upset, none reported      Mixed hyperlipidemia 07/09/2020     Priority: Medium     Last Assessment & Plan:    Formatting of this note is different from the original.      - His hyperlipidemia is a chronic stable problem   - Treatment is with Zetia 10 daily, he is statin intolerant, and LDL is at goal.     - Pertinent data reviewed:     Lab Results    Component  "Value Date     LDLCALC 89 07/17/2023       - No statin side effects noted.  Continue current treatment and recheck in 6-12 months.          Ht 1.727 m (5' 8\")   Wt 80.7 kg (178 lb)   BMI 27.06 kg/m      Impression/Plan:    ICD-10-CM    1. ENMANUEL (obstructive sleep apnea)  G47.33 Sleep Positioning Device ()      2. Essential hypertension  I10 Sleep Positioning Device ()      3. Prediabetes  R73.03 Sleep Positioning Device ()          Assessment:   Mild obstructive sleep apnea.  Sleep associated hypoxemia was not present.     Recommendations:  Consider auto-CPAP at 5-15 cmH2O, oral appliance therapy, or positional therapy.  Suggest optimizing sleep hygiene and avoiding sleep deprivation.  Weight management.     After discussion of patient's sleep study, we discussed treatment options of CPAP therapy, mandibular advancement device, and positional therapy.  Patient is opting for positional therapy.  He states he travels quite frequently and with the mild sleep apnea that he has he would like to try the most \"benign\" therapy first.  Instructed he may return to the clinic at any time.  Recommend patient optimize his sleep schedule as well as his sleep hygiene practices to mitigate any further sleep disruption  Recommend patient employ safe driving practices such as not driving a motor vehicle if he is drowsy.  Patient may return to the sleep medicine clinic on an as-needed basis     Diagnosis Code(s): Obstructive Sleep Apnea G47.33, Snoring R06.83  Comorbidity: Hypertension, generalized anxiety disorder    30 minutes spent with patient, all of which were spent face-to-face counseling, consulting, coordinating plan of care.      ROSA Silvestre CNP    CC:  Lewis Atkinson,   "

## 2024-06-11 NOTE — TELEPHONE ENCOUNTER
Patient Quality Outreach    Patient is due for the following:   Physical Annual Wellness Visit    Next Steps:   Patient has upcoming appointment, these items will be addressed at that time.    Type of outreach:    Chart review performed, no outreach needed.      Questions for provider review:    None           Kely Barrios

## 2024-07-09 ENCOUNTER — OFFICE VISIT (OUTPATIENT)
Dept: FAMILY MEDICINE | Facility: CLINIC | Age: 69
End: 2024-07-09
Payer: MEDICARE

## 2024-07-09 ENCOUNTER — ANCILLARY PROCEDURE (OUTPATIENT)
Dept: GENERAL RADIOLOGY | Facility: CLINIC | Age: 69
End: 2024-07-09
Payer: MEDICARE

## 2024-07-09 VITALS
RESPIRATION RATE: 16 BRPM | TEMPERATURE: 98 F | HEART RATE: 60 BPM | OXYGEN SATURATION: 96 % | DIASTOLIC BLOOD PRESSURE: 80 MMHG | SYSTOLIC BLOOD PRESSURE: 133 MMHG

## 2024-07-09 DIAGNOSIS — R07.81 RIB PAIN: Primary | ICD-10-CM

## 2024-07-09 LAB
ALBUMIN UR-MCNC: NEGATIVE MG/DL
ANION GAP SERPL CALCULATED.3IONS-SCNC: 9 MMOL/L (ref 7–15)
APPEARANCE UR: CLEAR
BILIRUB UR QL STRIP: NEGATIVE
BUN SERPL-MCNC: 11.4 MG/DL (ref 8–23)
CALCIUM SERPL-MCNC: 9.2 MG/DL (ref 8.8–10.2)
CHLORIDE SERPL-SCNC: 98 MMOL/L (ref 98–107)
COLOR UR AUTO: YELLOW
CREAT SERPL-MCNC: 1.07 MG/DL (ref 0.67–1.17)
DEPRECATED HCO3 PLAS-SCNC: 28 MMOL/L (ref 22–29)
EGFRCR SERPLBLD CKD-EPI 2021: 76 ML/MIN/1.73M2
GLUCOSE SERPL-MCNC: 88 MG/DL (ref 70–99)
GLUCOSE UR STRIP-MCNC: NEGATIVE MG/DL
HGB UR QL STRIP: NEGATIVE
KETONES UR STRIP-MCNC: NEGATIVE MG/DL
LEUKOCYTE ESTERASE UR QL STRIP: NEGATIVE
NITRATE UR QL: NEGATIVE
PH UR STRIP: 6.5 [PH] (ref 5–8)
POTASSIUM SERPL-SCNC: 4.8 MMOL/L (ref 3.4–5.3)
SODIUM SERPL-SCNC: 135 MMOL/L (ref 135–145)
SP GR UR STRIP: 1.01 (ref 1–1.03)
UROBILINOGEN UR STRIP-ACNC: 0.2 E.U./DL

## 2024-07-09 PROCEDURE — 99214 OFFICE O/P EST MOD 30 MIN: CPT

## 2024-07-09 PROCEDURE — 80048 BASIC METABOLIC PNL TOTAL CA: CPT | Performed by: FAMILY MEDICINE

## 2024-07-09 PROCEDURE — 36415 COLL VENOUS BLD VENIPUNCTURE: CPT

## 2024-07-09 PROCEDURE — 81003 URINALYSIS AUTO W/O SCOPE: CPT

## 2024-07-09 PROCEDURE — 71101 X-RAY EXAM UNILAT RIBS/CHEST: CPT | Mod: TC | Performed by: RADIOLOGY

## 2024-07-09 RX ORDER — OXYCODONE HYDROCHLORIDE 5 MG/1
5 TABLET ORAL EVERY 6 HOURS PRN
Qty: 12 TABLET | Refills: 0 | Status: SHIPPED | OUTPATIENT
Start: 2024-07-09 | End: 2024-07-23

## 2024-07-09 RX ORDER — OXYCODONE HYDROCHLORIDE 5 MG/1
5 TABLET ORAL EVERY 6 HOURS PRN
Qty: 12 TABLET | Refills: 0 | Status: SHIPPED | OUTPATIENT
Start: 2024-07-09 | End: 2024-07-09

## 2024-07-09 NOTE — PROGRESS NOTES
Reviewed XRAY--showed broken ribs  Discussed with patient.  Ice frequently.  Deep breaths occasionally.  Oxycodone, tylenol or ibuprofen as needed.    Sneha Mckeon MD

## 2024-07-09 NOTE — PROGRESS NOTES
Assessment & Plan       ICD-10-CM    1. Rib pain  R07.81 XR Ribs & Chest Left G/E 3 Views     UA Macroscopic with reflex to Microscopic and Culture - Clinic Collect     Basic metabolic panel  (Ca, Cl, CO2, Creat, Gluc, K, Na, BUN)     oxyCODONE (ROXICODONE) 5 MG tablet     CBC with platelets and differential         Fell hard into a solid object a day ago. Still hurting pretty severely, hard to walk, hard to breathe. I'm going to get a CXR to make sure no broken rib (he's had that before and this is not dissimilar), a BMP to check if kidney affected and a UA for the same reason, and a CBC in case there is bleeding. If everything is negative, may provide pain medication for a couple days and follow up with PCP. If any signs of potential organ injury or internal bleeding, should do CT and/or ER.     UA negative for hematuria, reassuring regarding possibility of kidney injury.     Follow-up:   Would like results communicated via [employer]    Instructions for results if author not in clinic:   - If signs of serious injury on labs and imaging, should go to er or get a ct or both. Otherwise can do oral pain meds and PCP.     Follow up with primary care provider with any problems, questions or concerns or if symptoms worsen or fail to improve. Patient agreed to plan and verbalized understanding.     Jovan Nicole is a 69 year old male who presents to clinic today for the following health issues:  Chief Complaint   Patient presents with    Fall      fell on back yesterday against a solid object. struck the object on left side in the area between  kidney and lowest rib.  experiencing pain that has not subsided in 24 hours.         HPI    Was in a restaurant and was carrying a tray and was going to sit down on a bench and it was actually a chair and hit the L lowest rib really hard. No abdominal pain or nausea, no hematuria.     Review of Systems    10 point ROS performed and negative except as noted in HPI.     Problem  List:  2024-05: Prostate cancer (H)  2024-03: Ulnar neuropathy of left upper extremity  2024-02: Prediabetes  2024-02: Essential hypertension  2024-02: Generalized anxiety disorder  2024-02: History of prostate cancer  2024-02: History of colectomy  2024-02: History of prostatectomy  2024-02: History of appendectomy  2024-02: Chronic right-sided low back pain without sciatica  2024-02: Sensorineural hearing loss, bilateral  2024-02: Snoring  2024-02: Lesion of left ulnar nerve  2023-03: Ulnar nerve compression  2020-07: Adjustment reaction  2020-07: Umbilical hernia  2020-07: Sciatica  2020-07: Osteoarthritis of shoulder  2020-07: Mixed hyperlipidemia      No past medical history on file.    Social History     Tobacco Use    Smoking status: Never     Passive exposure: Never    Smokeless tobacco: Never   Substance Use Topics    Alcohol use: Not on file           Objective    /80   Pulse 60   Temp 98  F (36.7  C) (Oral)   Resp 16   SpO2 96%   Physical Exam   Constitutional:       General: Patient is not in acute distress.     Appearance: Normal appearance.   HENT:      Head: Normocephalic and atraumatic.      Right Ear: External ear normal.      Left Ear: External ear normal.      Nose: No congestion, rhinorrhea.      Mouth/Throat:      Mouth: Mucous membranes are moist.      Pharynx: Oropharynx is clear, No exudate.   Eyes:      General: No scleral icterus.     Extraocular Movements: Extraocular movements intact.      Conjunctiva/sclera: Conjunctivae normal.      Pupils: Pupils are equal, round, and reactive to light.   Pulmonary:      Effort: Pulmonary effort is normal.   Cardiovascular:      Regular heart rate  Abdominal:      General: Abdomen is flat.   Musculoskeletal:         General: No swelling or deformity. Normal range of motion.      Cervical back: Normal range of motion and neck supple.   Skin:     General: Skin is warm and dry.      Coloration: Skin is not jaundiced.      Findings: L side and  back about 12 inches of faint abrasions angled from R shoulder down to L hip. Small amt of bruising over lowest rib. Marked tenderness to palpation under lowest rib on back.   Neurological:      General: No focal deficit present.      Mental Status: Patient is alert. Mental status is at baseline.   Psychiatric:         Mood and Affect: Mood normal.         Behavior: Behavior normal.         Thought Content: Thought content normal.       Mikayla Moralez MD

## 2024-07-19 SDOH — HEALTH STABILITY: PHYSICAL HEALTH: ON AVERAGE, HOW MANY DAYS PER WEEK DO YOU ENGAGE IN MODERATE TO STRENUOUS EXERCISE (LIKE A BRISK WALK)?: 5 DAYS

## 2024-07-19 SDOH — HEALTH STABILITY: PHYSICAL HEALTH: ON AVERAGE, HOW MANY MINUTES DO YOU ENGAGE IN EXERCISE AT THIS LEVEL?: 60 MIN

## 2024-07-19 ASSESSMENT — SOCIAL DETERMINANTS OF HEALTH (SDOH): HOW OFTEN DO YOU GET TOGETHER WITH FRIENDS OR RELATIVES?: ONCE A WEEK

## 2024-07-23 ENCOUNTER — OFFICE VISIT (OUTPATIENT)
Dept: FAMILY MEDICINE | Facility: CLINIC | Age: 69
End: 2024-07-23
Payer: MEDICARE

## 2024-07-23 VITALS
SYSTOLIC BLOOD PRESSURE: 118 MMHG | BODY MASS INDEX: 27.1 KG/M2 | OXYGEN SATURATION: 97 % | HEART RATE: 70 BPM | HEIGHT: 68 IN | DIASTOLIC BLOOD PRESSURE: 62 MMHG | WEIGHT: 178.8 LBS | RESPIRATION RATE: 16 BRPM | TEMPERATURE: 97.9 F

## 2024-07-23 DIAGNOSIS — G56.22 ULNAR NEUROPATHY OF LEFT UPPER EXTREMITY: ICD-10-CM

## 2024-07-23 DIAGNOSIS — S22.42XD CLOSED FRACTURE OF MULTIPLE RIBS OF LEFT SIDE WITH ROUTINE HEALING, SUBSEQUENT ENCOUNTER: ICD-10-CM

## 2024-07-23 DIAGNOSIS — Z12.5 ENCOUNTER FOR SCREENING FOR MALIGNANT NEOPLASM OF PROSTATE: ICD-10-CM

## 2024-07-23 DIAGNOSIS — Z85.46 HISTORY OF PROSTATE CANCER: ICD-10-CM

## 2024-07-23 DIAGNOSIS — M54.50 CHRONIC RIGHT-SIDED LOW BACK PAIN WITHOUT SCIATICA: ICD-10-CM

## 2024-07-23 DIAGNOSIS — Z00.00 ENCOUNTER FOR MEDICARE ANNUAL WELLNESS EXAM: Primary | ICD-10-CM

## 2024-07-23 DIAGNOSIS — H90.3 SENSORINEURAL HEARING LOSS, BILATERAL: ICD-10-CM

## 2024-07-23 DIAGNOSIS — E78.2 MIXED HYPERLIPIDEMIA: ICD-10-CM

## 2024-07-23 DIAGNOSIS — G89.29 CHRONIC RIGHT-SIDED LOW BACK PAIN WITHOUT SCIATICA: ICD-10-CM

## 2024-07-23 DIAGNOSIS — R73.03 PREDIABETES: ICD-10-CM

## 2024-07-23 DIAGNOSIS — I10 ESSENTIAL HYPERTENSION: ICD-10-CM

## 2024-07-23 DIAGNOSIS — F41.1 GENERALIZED ANXIETY DISORDER: ICD-10-CM

## 2024-07-23 DIAGNOSIS — G47.33 OSA (OBSTRUCTIVE SLEEP APNEA): ICD-10-CM

## 2024-07-23 LAB
CHOLEST SERPL-MCNC: 145 MG/DL
FASTING STATUS PATIENT QL REPORTED: YES
HBA1C MFR BLD: 6 % (ref 0–5.6)
HDLC SERPL-MCNC: 53 MG/DL
LDLC SERPL CALC-MCNC: 64 MG/DL
NONHDLC SERPL-MCNC: 92 MG/DL
PSA SERPL DL<=0.01 NG/ML-MCNC: <0.01 NG/ML (ref 0–4.5)
TRIGL SERPL-MCNC: 141 MG/DL

## 2024-07-23 PROCEDURE — 80061 LIPID PANEL: CPT | Performed by: STUDENT IN AN ORGANIZED HEALTH CARE EDUCATION/TRAINING PROGRAM

## 2024-07-23 PROCEDURE — 36415 COLL VENOUS BLD VENIPUNCTURE: CPT | Performed by: STUDENT IN AN ORGANIZED HEALTH CARE EDUCATION/TRAINING PROGRAM

## 2024-07-23 PROCEDURE — 99213 OFFICE O/P EST LOW 20 MIN: CPT | Mod: 25 | Performed by: STUDENT IN AN ORGANIZED HEALTH CARE EDUCATION/TRAINING PROGRAM

## 2024-07-23 PROCEDURE — G0103 PSA SCREENING: HCPCS | Performed by: STUDENT IN AN ORGANIZED HEALTH CARE EDUCATION/TRAINING PROGRAM

## 2024-07-23 PROCEDURE — 91320 SARSCV2 VAC 30MCG TRS-SUC IM: CPT | Performed by: STUDENT IN AN ORGANIZED HEALTH CARE EDUCATION/TRAINING PROGRAM

## 2024-07-23 PROCEDURE — G0438 PPPS, INITIAL VISIT: HCPCS | Performed by: STUDENT IN AN ORGANIZED HEALTH CARE EDUCATION/TRAINING PROGRAM

## 2024-07-23 PROCEDURE — 90480 ADMN SARSCOV2 VAC 1/ONLY CMP: CPT | Performed by: STUDENT IN AN ORGANIZED HEALTH CARE EDUCATION/TRAINING PROGRAM

## 2024-07-23 PROCEDURE — 83036 HEMOGLOBIN GLYCOSYLATED A1C: CPT | Performed by: STUDENT IN AN ORGANIZED HEALTH CARE EDUCATION/TRAINING PROGRAM

## 2024-07-23 NOTE — PROGRESS NOTES
Preventive Care Visit  [facility]  Lewis Atkinson MD, Family Medicine  Jul 23, 2024      Assessment & Plan     Encounter for Medicare annual wellness exam  Corey is a 69-year-old male presenting today for Medicare annual wellness visit.  He has a history of anxiety, hyperlipidemia, hypertension, prediabetes, mild obstructive sleep apnea/positional, prostate cancer history and ulnar neuropathy.    We discussed preventative screening, he is up-to-date on all screenings.  Colon cancer screening is recommended in 2029.    Examination today is normal with the exception of sensitive ribs on left side, otherwise normal.    Recommended for second dose of COVID vaccination per CDC guidelines for patients over 60 years old, he was agreeable and this was received.  Other issues as addressed below    Mixed hyperlipidemia  Restarted on rosuvastatin 5 mg which he has tolerated well, previously had issues with muscle pain on statin medication, but not on this medicine.  LDL goal below 100, will repeat lipid panel fasting today.  - Lipid panel reflex to direct LDL Fasting    Closed fracture of multiple ribs of left side with routine healing, subsequent encounter  Had fracture of fourth and fifth posterior rib, nondisplaced, visit occurred on July 9 with x-ray with urgent care.  Tylenol is controlling pain, pain has been improving over time.  Was previously given oxycodone for pain initially, however has not been using this medication anymore.  He will follow-up with me if there is any increase in pain or if pain does not continue to improve over the next 1 to 2 months.    ENMANUEL (obstructive sleep apnea)  Has mild positional obstructive sleep apnea, after discussion with sleep medicine has decided to use positional therapy, he will follow-up with me if having continued snoring, daytime somnolence etc.    Prediabetes  Last A1c 6.0% on March 1, 2024, on metformin 500 mg, recommend repeat A1c at this time.  He was  agreeable and this was ordered.   - Hemoglobin A1c    History of prostate cancer  History of prostatectomy, recommend repeat PSA, should be undetectable.  - PSA, screen    Encounter for screening for malignant neoplasm of prostate  See above  - PSA, screen    Chronic right-sided low back pain without sciatica  Well-controlled with chiropractic care, continue chiropractic care    Ulnar neuropathy of left upper extremity  Had cubital tunnel release on May 31, 2024, reports that still has some symptoms but generally improving    Sensorineural hearing loss, bilateral  Well-controlled with hearing aids bilaterally    Essential hypertension  Well-controlled on lisinopril 20 mg, continue current treatment    Generalized anxiety disorder  We decreased fluoxetine from 120 mg daily to 80 mg daily, and he is still doing well with mood.  Will currently stay at this dose, if still having normal mood at her next visit would consider further decrease of medication from 80 mg to 40 mg.  Continue trazodone as needed for insomnia.               Counseling  Appropriate preventive services were addressed with this patient via screening, questionnaire, or discussion as appropriate for fall prevention, nutrition, physical activity, Tobacco-use cessation, weight loss and cognition.  Checklist reviewing preventive services available has been given to the patient.  Reviewed patient's diet, addressing concerns and/or questions.   I have reviewed Opioid Use Disorder and Substance Use Disorder risk factors and made any needed referrals.     Follow-up in 1 year    Jovan Nicole is a 69 year old, presenting for the following:  Wellness Visit (PSA check.)        7/23/2024     8:00 AM   Additional Questions   Roomed by CARLITO RUANO         Health Care Directive  Patient does not have a Health Care Directive or Living Will: Discussed advance care planning with patient; information given to patient to review.    HPI  He is feeling well, except that  he has rib pain on the left posterior ribs after having a fall at a restaurant earlier this month, the pain was initially improved with oxycodone, no longer requires oxycodone and is controlling this with Tylenol 2-3 times a day reports that the pain has improved substantially.  Otherwise is feeling well.  No other concerns at this time.            7/19/2024   General Health   How would you rate your overall physical health? Good   Feel stress (tense, anxious, or unable to sleep) Not at all            7/19/2024   Nutrition   Diet: Regular (no restrictions)            7/19/2024   Exercise   Days per week of moderate/strenous exercise 5 days   Average minutes spent exercising at this level 60 min            7/19/2024   Social Factors   Frequency of gathering with friends or relatives Once a week   Worry food won't last until get money to buy more No   Food not last or not have enough money for food? No   Do you have housing? (Housing is defined as stable permanent housing and does not include staying ouside in a car, in a tent, in an abandoned building, in an overnight shelter, or couch-surfing.) Yes   Are you worried about losing your housing? No   Lack of transportation? No   Unable to get utilities (heat,electricity)? No            7/19/2024   Fall Risk   Fallen 2 or more times in the past year? No   Trouble with walking or balance? No             7/19/2024   Activities of Daily Living- Home Safety   Needs help with the following daily activites None of the above   Safety concerns in the home None of the above            7/19/2024   Dental   Dentist two times every year? Yes            7/19/2024   Hearing Screening   Hearing concerns? None of the above            7/19/2024   Driving Risk Screening   Patient/family members have concerns about driving No            7/19/2024   General Alertness/Fatigue Screening   Have you been more tired than usual lately? No            7/19/2024   Urinary Incontinence Screening  "  Bothered by leaking urine in past 6 months No            7/19/2024   TB Screening   Were you born outside of the [place]? No            Today's PHQ-2 Score:       7/22/2024     8:45 AM   PHQ-2 ( 1999 [employer])   Q1: Little interest or pleasure in doing things 0   Q2: Feeling down, depressed or hopeless 0   PHQ-2 Score 0   Q1: Little interest or pleasure in doing things Not at all   Q2: Feeling down, depressed or hopeless Not at all   PHQ-2 Score 0           7/19/2024   Substance Use   Alcohol more than 3/day or more than 7/wk No   Do you have a current opioid prescription? (!) YES   How severe/bad is pain from 1 to 10? 6/10   Do you use any other substances recreationally? No             No data to display              Low Risk (0-3)  Moderate Risk (4-7)  High Risk (>8)  Social History     Tobacco Use    Smoking status: Never     Passive exposure: Never    Smokeless tobacco: Never   Vaping Use    Vaping status: Never Used           7/19/2024   AAA Screening   Family history of Abdominal Aortic Aneurysm (AAA)? No      Last PSA: No results found for: \"PSA\"  ASCVD Risk   The 10-year ASCVD risk score (Gilles VELASCO, et al., 2019) is: 12.8%    Values used to calculate the score:      Age: 69 years      Sex: Male      Is Non- : No      Diabetic: No      Tobacco smoker: No      Systolic Blood Pressure: 118 mmHg      Is BP treated: Yes      HDL Cholesterol: 58 mg/dL      Total Cholesterol: 177 mg/dL            Reviewed and updated as needed this visit by Provider                    No past medical history on file.  Past Surgical History:   Procedure Laterality Date    DECOMPRESSION CUBITAL TUNNEL Left 5/31/2024    Procedure: RELEASE, CUBITAL TUNNEL LEFT;  Surgeon: George Garrett MD;  Location: [facility] OR     Labs reviewed in EPIC  BP Readings from Last 3 Encounters:   07/23/24 118/62   07/09/24 133/80   06/10/24 128/74    Wt Readings from Last 3 Encounters:   07/23/24 81.1 kg (178 lb 12.8 oz)   06/11/24 " 80.7 kg (178 lb)   06/10/24 84.8 kg (187 lb)                  Patient Active Problem List   Diagnosis    Ulnar nerve compression    Prediabetes    Essential hypertension    Generalized anxiety disorder    History of prostate cancer    History of colectomy    History of prostatectomy    History of appendectomy    Chronic right-sided low back pain without sciatica    Sensorineural hearing loss, bilateral    Snoring    Lesion of left ulnar nerve    Ulnar neuropathy of left upper extremity    Prostate cancer (H)    Umbilical hernia    Sciatica    Osteoarthritis of shoulder    Mixed hyperlipidemia    Adjustment reaction     Past Surgical History:   Procedure Laterality Date    DECOMPRESSION CUBITAL TUNNEL Left 5/31/2024    Procedure: RELEASE, CUBITAL TUNNEL LEFT;  Surgeon: George Garrett MD;  Location: [facility] [place] OR       Social History     Tobacco Use    Smoking status: Never     Passive exposure: Never    Smokeless tobacco: Never   Substance Use Topics    Alcohol use: Not on file     No family history on file.      Current Outpatient Medications   Medication Sig Dispense Refill    FLUoxetine (PROZAC) 40 MG capsule Take 2 capsules (80 mg) by mouth daily 180 capsule 3    lisinopril (ZESTRIL) 20 MG tablet       metFORMIN (GLUCOPHAGE XR) 500 MG 24 hr tablet TAKE 1 TAB BY MOUTH WITH BREAKFAST DAILY      rosuvastatin (CRESTOR) 5 MG tablet Take 1 tablet (5 mg) by mouth daily 90 tablet 3    sildenafil (VIAGRA) 25 MG tablet Take 25 mg by mouth      traZODone (DESYREL) 100 MG tablet Take 1 tablet (100 mg) by mouth at bedtime for 120 days 30 tablet 3    UNABLE TO FIND MEDICATION NAME: Multivitamin       Allergies   Allergen Reactions    Statins      myalgias     Recent Labs   Lab Test 07/09/24  1442 07/17/23  0842   TRIG  --  152*   CR 1.07  --    GFRESTIMATED 76  --    POTASSIUM 4.8  --       Current providers sharing in care for this patient include:  Patient Care Team:  Lewis Atkinson MD as PCP - General (Family Medicine)  Demetrio  "MD Lewis as Assigned PCP  Fawn White APRN CNP as Assigned Pulmonology Provider  Farhat Zheng PA-C as Assigned Musculoskeletal Provider    The following health maintenance items are reviewed in Epic and correct as of today:  Health Maintenance   Topic Date Due    COVID-19 Vaccine (7 - 2023-24 season) 07/14/2024    LIPID  07/17/2024    MEDICARE ANNUAL WELLNESS VISIT  07/19/2024    INFLUENZA VACCINE (1) 09/01/2024    DTAP/TDAP/TD IMMUNIZATION (4 - Td or Tdap) 11/14/2024    ANNUAL REVIEW OF HM ORDERS  02/19/2025    FALL RISK ASSESSMENT  07/23/2025    GLUCOSE  07/09/2027    COLORECTAL CANCER SCREENING  02/28/2029    ADVANCE CARE PLANNING  05/06/2029    HEPATITIS C SCREENING  Completed    PHQ-2 (once per calendar year)  Completed    Pneumococcal Vaccine: 65+ Years  Completed    ZOSTER IMMUNIZATION  Completed    RSV VACCINE (Pregnancy & 60+)  Completed    IPV IMMUNIZATION  Aged Out    HPV IMMUNIZATION  Aged Out    MENINGITIS IMMUNIZATION  Aged Out    RSV MONOCLONAL ANTIBODY  Aged Out         Review of Systems  Constitutional, neuro, ENT, endocrine, pulmonary, cardiac, gastrointestinal, genitourinary, musculoskeletal, integument and psychiatric systems are negative, except as otherwise noted.     Objective    Exam  /62 (BP Location: Right arm, Patient Position: Sitting, Cuff Size: Adult Regular)   Pulse 70   Temp 97.9  F (36.6  C) (Oral)   Resp 16   Ht 1.72 m (5' 7.72\")   Wt 81.1 kg (178 lb 12.8 oz)   SpO2 97%   BMI 27.41 kg/m     Estimated body mass index is 27.41 kg/m  as calculated from the following:    Height as of this encounter: 1.72 m (5' 7.72\").    Weight as of this encounter: 81.1 kg (178 lb 12.8 oz).    Physical Exam  GENERAL: alert and no distress  EYES: Eyes grossly normal to inspection, PERRL and conjunctivae and sclerae normal  HENT: ear canals and TM's normal, nose and mouth without ulcers or lesions  NECK: no adenopathy, no asymmetry, masses, or scars  RESP: lungs " clear to auscultation - no rales, rhonchi or wheezes  CV: regular rate and rhythm, normal S1 S2, no S3 or S4, no murmur, click or rub, no peripheral edema  ABDOMEN: soft, nontender, no hepatosplenomegaly, no masses and bowel sounds normal  MS: no gross musculoskeletal defects noted, no edema  SKIN: no suspicious lesions or rashes  NEURO: Normal strength and tone, mentation intact and speech normal  PSYCH: mentation appears normal, affect normal/bright        7/23/2024   Mini Cog   Clock Draw Score 2 Normal   3 Item Recall 3 objects recalled   Mini Cog Total Score 5             Signed Electronically by: Lewis Atkinson MD

## 2024-07-23 NOTE — PATIENT INSTRUCTIONS
Patient Education   Preventive Care Advice   This is general advice given by our system to help you stay healthy. However, your care team may have specific advice just for you. Please talk to your care team about your preventive care needs.  Nutrition  Eat 5 or more servings of fruits and vegetables each day.  Try wheat bread, brown rice and whole grain pasta (instead of white bread, rice, and pasta).  Get enough calcium and vitamin D. Check the label on foods and aim for 100% of the RDA (recommended daily allowance).  Lifestyle  Exercise at least 150 minutes each week  (30 minutes a day, 5 days a week).  Do muscle strengthening activities 2 days a week. These help control your weight and prevent disease.  No smoking.  Wear sunscreen to prevent skin cancer.  Have a dental exam and cleaning every 6 months.  Yearly exams  See your health care team every year to talk about:  Any changes in your health.  Any medicines your care team has prescribed.  Preventive care, family planning, and ways to prevent chronic diseases.  Shots (vaccines)   HPV shots (up to age 26), if you've never had them before.  Hepatitis B shots (up to age 59), if you've never had them before.  COVID-19 shot: Get this shot when it's due.  Flu shot: Get a flu shot every year.  Tetanus shot: Get a tetanus shot every 10 years.  Pneumococcal, hepatitis A, and RSV shots: Ask your care team if you need these based on your risk.  Shingles shot (for age 50 and up)  General health tests  Diabetes screening:  Starting at age 35, Get screened for diabetes at least every 3 years.  If you are younger than age 35, ask your care team if you should be screened for diabetes.  Cholesterol test: At age 39, start having a cholesterol test every 5 years, or more often if advised.  Bone density scan (DEXA): At age 50, ask your care team if you should have this scan for osteoporosis (brittle bones).  Hepatitis C: Get tested at least once in your life.  STIs (sexually  transmitted infections)  Before age 24: Ask your care team if you should be screened for STIs.  After age 24: Get screened for STIs if you're at risk. You are at risk for STIs (including HIV) if:  You are sexually active with more than one person.  You don't use condoms every time.  You or a partner was diagnosed with a sexually transmitted infection.  If you are at risk for HIV, ask about PrEP medicine to prevent HIV.  Get tested for HIV at least once in your life, whether you are at risk for HIV or not.  Cancer screening tests  Cervical cancer screening: If you have a cervix, begin getting regular cervical cancer screening tests starting at age 21.  Breast cancer scan (mammogram): If you've ever had breasts, begin having regular mammograms starting at age 40. This is a scan to check for breast cancer.  Colon cancer screening: It is important to start screening for colon cancer at age 45.  Have a colonoscopy test every 10 years (or more often if you're at risk) Or, ask your provider about stool tests like a FIT test every year or Cologuard test every 3 years.  To learn more about your testing options, visit:   .  For help making a decision, visit:   https://bit.ly/fi70928.  Prostate cancer screening test: If you have a prostate, ask your care team if a prostate cancer screening test (PSA) at age 55 is right for you.  Lung cancer screening: If you are a current or former smoker ages 50 to 80, ask your care team if ongoing lung cancer screenings are right for you.  For informational purposes only. Not to replace the advice of your health care provider. Copyright   2023 [facility]. All rights reserved. Clinically reviewed by the [facility]itions Program. Glad to Have You [id number] - REV 01/24.  Chronic Pain: Care Instructions  Your Care Instructions     Chronic pain is pain that lasts a long time (months or even years) and may or may not have a clear cause. It is different from acute pain, which  usually does have a clear cause--like an injury or illness--and gets better over time. Chronic pain:  Lasts over time but may vary from day to day.  Does not go away despite efforts to end it.  May disrupt your sleep and lead to fatigue.  May cause depression or anxiety.  May make your muscles tense, causing more pain.  Can disrupt your work, hobbies, home life, and relationships with friends and family.  Chronic pain is a very real condition. It is not just in your head. Treatment can help and usually includes several methods used together, such as medicines, physical therapy, exercise, and other treatments. Learning how to relax and changing negative thought patterns can also help you cope.  Chronic pain is complex. Taking an active role in your treatment will help you better manage your pain. Tell your doctor if you have trouble dealing with your pain. You may have to try several things before you find what works best for you.  Follow-up care is a key part of your treatment and safety. Be sure to make and go to all appointments, and call your doctor if you are having problems. It's also a good idea to know your test results and keep a list of the medicines you take.  How can you care for yourself at home?  Pace yourself. Break up large jobs into smaller tasks. Save harder tasks for days when you have less pain, or go back and forth between hard tasks and easier ones. Take rest breaks.  Relax, and reduce stress. Relaxation techniques such as deep breathing or meditation can help.  Keep moving. Gentle, daily exercise can help reduce pain over the long run. Try low- or no-impact exercises such as walking, swimming, and stationary biking. Do stretches to stay flexible.  Try heat, cold packs, and massage.  Get enough sleep. Chronic pain can make you tired and drain your energy. Talk with your doctor if you have trouble sleeping because of pain.  Think positive. Your thoughts can affect your pain level. Do things that  you enjoy to distract yourself when you have pain instead of focusing on the pain. See a movie, read a book, listen to music, or spend time with a friend.  If you think you are depressed, talk to your doctor about treatment.  Keep a daily pain diary. Record how your moods, thoughts, sleep patterns, activities, and medicine affect your pain. You may find that your pain is worse during or after certain activities or when you are feeling a certain emotion. Having a record of your pain can help you and your doctor find the best ways to treat your pain.  Take pain medicines exactly as directed.  If the doctor gave you a prescription medicine for pain, take it as prescribed.  If you are not taking a prescription pain medicine, ask your doctor if you can take an over-the-counter medicine.  Reducing constipation caused by pain medicine  Talk to your doctor about a laxative. If a laxative doesn't work, your doctor may suggest a prescription medicine.  Include fruits, vegetables, beans, and whole grains in your diet each day. These foods are high in fiber.  If your doctor recommends it, get more exercise. Walking is a good choice. Bit by bit, increase the amount you walk every day. Try for at least 30 minutes on most days of the week.  Schedule time each day for a bowel movement. A daily routine may help. Take your time and do not strain when having a bowel movement.  When should you call for help?   Call your doctor now or seek immediate medical care if:    Your pain gets worse or is out of control.     You feel down or blue, or you do not enjoy things like you once did. You may be depressed, which is common in people with chronic pain. Depression can be treated.     You have vomiting or cramps for more than 2 hours.   Watch closely for changes in your health, and be sure to contact your doctor if:    You cannot sleep because of pain.     You are very worried or anxious about your pain.     You have trouble taking your pain  "medicine.     You have any concerns about your pain medicine.     You have trouble with bowel movements, such as:  No bowel movement in 3 days.  Blood in the anal area, in your stool, or on the toilet paper.  Diarrhea for more than 24 hours.   Where can you learn more?  Go to https://www.[employer].net/patiented  Enter N004 in the search box to learn more about \"Chronic Pain: Care Instructions.\"  Current as of: July 10, 2023               Content Version: 14.0    8582-6224 [employer].   Care instructions adapted under license by your healthcare professional. If you have questions about a medical condition or this instruction, always ask your healthcare professional. [employer] disclaims any warranty or liability for your use of this information.         "

## 2024-07-29 ENCOUNTER — MYC MEDICAL ADVICE (OUTPATIENT)
Dept: FAMILY MEDICINE | Facility: CLINIC | Age: 69
End: 2024-07-29
Payer: MEDICARE

## 2024-07-29 DIAGNOSIS — I10 BENIGN ESSENTIAL HYPERTENSION: ICD-10-CM

## 2024-07-29 DIAGNOSIS — R73.03 PREDIABETES: Primary | ICD-10-CM

## 2024-07-29 RX ORDER — LISINOPRIL 20 MG/1
20 TABLET ORAL DAILY
Qty: 90 TABLET | Refills: 2 | Status: SHIPPED | OUTPATIENT
Start: 2024-07-29

## 2024-07-29 RX ORDER — METFORMIN HCL 500 MG
500 TABLET, EXTENDED RELEASE 24 HR ORAL
Qty: 90 TABLET | Refills: 0 | Status: SHIPPED | OUTPATIENT
Start: 2024-07-29

## 2024-10-19 DIAGNOSIS — R73.03 PREDIABETES: ICD-10-CM

## 2024-10-21 RX ORDER — METFORMIN HYDROCHLORIDE 500 MG/1
500 TABLET, EXTENDED RELEASE ORAL
Qty: 90 TABLET | Refills: 2 | Status: SHIPPED | OUTPATIENT
Start: 2024-10-21

## 2025-04-22 DIAGNOSIS — R73.03 PREDIABETES: ICD-10-CM

## 2025-04-22 RX ORDER — METFORMIN HYDROCHLORIDE 500 MG/1
500 TABLET, EXTENDED RELEASE ORAL
Qty: 90 TABLET | Refills: 2 | OUTPATIENT
Start: 2025-04-22

## 2025-04-23 DIAGNOSIS — F41.1 GENERALIZED ANXIETY DISORDER: ICD-10-CM

## 2025-04-23 RX ORDER — FLUOXETINE HYDROCHLORIDE 40 MG/1
80 CAPSULE ORAL DAILY
Qty: 180 CAPSULE | Refills: 3 | Status: SHIPPED | OUTPATIENT
Start: 2025-04-23

## 2025-07-12 DIAGNOSIS — E78.2 MIXED HYPERLIPIDEMIA: ICD-10-CM

## 2025-07-12 DIAGNOSIS — I10 BENIGN ESSENTIAL HYPERTENSION: ICD-10-CM

## 2025-07-14 RX ORDER — LISINOPRIL 20 MG/1
20 TABLET ORAL DAILY
Qty: 90 TABLET | Refills: 0 | Status: SHIPPED | OUTPATIENT
Start: 2025-07-14

## 2025-07-14 RX ORDER — ROSUVASTATIN CALCIUM 5 MG/1
5 TABLET, COATED ORAL DAILY
Qty: 30 TABLET | Refills: 0 | Status: SHIPPED | OUTPATIENT
Start: 2025-07-14

## 2025-07-22 DIAGNOSIS — R73.03 PREDIABETES: ICD-10-CM

## 2025-07-22 RX ORDER — METFORMIN HYDROCHLORIDE 500 MG/1
500 TABLET, EXTENDED RELEASE ORAL
Qty: 90 TABLET | Refills: 2 | Status: SHIPPED | OUTPATIENT
Start: 2025-07-22

## 2025-07-25 PROBLEM — C61 PROSTATE CANCER (H): Status: RESOLVED | Noted: 2024-05-06 | Resolved: 2025-07-25

## 2025-08-06 DIAGNOSIS — E78.2 MIXED HYPERLIPIDEMIA: ICD-10-CM

## 2025-08-07 RX ORDER — ROSUVASTATIN CALCIUM 5 MG/1
5 TABLET, COATED ORAL DAILY
Qty: 90 TABLET | Refills: 1 | Status: SHIPPED | OUTPATIENT
Start: 2025-08-07

## (undated) DEVICE — CAST PADDING 3" STERILE 9043S

## (undated) DEVICE — SU VICRYL+ 3-0 27IN SH UND VCP416H

## (undated) DEVICE — PREP CHLORAPREP 26ML TINTED HI-LITE ORANGE 930815

## (undated) DEVICE — SOL NACL 0.9% IRRIG 500ML BOTTLE 2F7123

## (undated) DEVICE — LINEN ORTHO PACK 5446

## (undated) DEVICE — DRSG ABDOMINAL 07 1/2X8" 7197D

## (undated) DEVICE — PACK HAND CUSTOM ASC

## (undated) DEVICE — COVER CAMERA IN-LIGHT DISP LT-C02

## (undated) DEVICE — DRSG STERI STRIP 1X5" R1548

## (undated) DEVICE — SU MONOCRYL 4-0 PS-2 27" UND Y426H

## (undated) DEVICE — DRAPE CONVERTORS U-DRAPE 60X72" 8476

## (undated) DEVICE — DRSG GAUZE 4X4" 3033

## (undated) DEVICE — SOL WATER IRRIG 500ML BOTTLE 2F7113

## (undated) DEVICE — GLOVE BIOGEL PI MICRO SZ 7.0 48570

## (undated) RX ORDER — FENTANYL CITRATE-0.9 % NACL/PF 10 MCG/ML
PLASTIC BAG, INJECTION (ML) INTRAVENOUS
Status: DISPENSED
Start: 2024-05-31

## (undated) RX ORDER — ACETAMINOPHEN 325 MG/1
TABLET ORAL
Status: DISPENSED
Start: 2024-05-31

## (undated) RX ORDER — PROPOFOL 10 MG/ML
INJECTION, EMULSION INTRAVENOUS
Status: DISPENSED
Start: 2024-05-31

## (undated) RX ORDER — FENTANYL CITRATE 50 UG/ML
INJECTION, SOLUTION INTRAMUSCULAR; INTRAVENOUS
Status: DISPENSED
Start: 2024-05-31